# Patient Record
Sex: MALE | Race: WHITE | Employment: FULL TIME | URBAN - METROPOLITAN AREA
[De-identification: names, ages, dates, MRNs, and addresses within clinical notes are randomized per-mention and may not be internally consistent; named-entity substitution may affect disease eponyms.]

---

## 2017-10-13 PROCEDURE — 36415 COLL VENOUS BLD VENIPUNCTURE: CPT | Performed by: FAMILY MEDICINE

## 2017-10-13 PROCEDURE — 87591 N.GONORRHOEAE DNA AMP PROB: CPT | Performed by: FAMILY MEDICINE

## 2017-10-13 PROCEDURE — 87491 CHLMYD TRACH DNA AMP PROBE: CPT | Performed by: FAMILY MEDICINE

## 2017-10-13 PROCEDURE — 87529 HSV DNA AMP PROBE: CPT | Performed by: FAMILY MEDICINE

## 2017-10-27 PROBLEM — A60.02 HERPES GENITALIS IN MEN: Status: ACTIVE | Noted: 2017-10-27

## 2018-07-09 PROBLEM — S62.327A CLOSED DISPLACED FRACTURE OF SHAFT OF FIFTH METACARPAL BONE OF LEFT HAND, INITIAL ENCOUNTER: Status: ACTIVE | Noted: 2018-07-09

## 2021-11-15 PROCEDURE — 88305 TISSUE EXAM BY PATHOLOGIST: CPT | Performed by: INTERNAL MEDICINE

## 2023-12-20 ENCOUNTER — TELEPHONE (OUTPATIENT)
Dept: FAMILY MEDICINE CLINIC | Facility: CLINIC | Age: 33
End: 2023-12-20

## 2023-12-20 NOTE — TELEPHONE ENCOUNTER
S/w pt on this--reports he called to book new pt px with Dr CARTER    Reports 2 mos possible sleep apnea-sts he would have times where he gasps for breath in mid of night  Reports since sat intermittent sob and lt headed  Sts he wonders if it is is anxiety  Feels his heart races at time  When asking if any CP--denies this  Sts he feels like a gas bubble in his chest and back at times  Sts he feels better after burping  Wonders if gas issue    No current sob.    Advised since not our pt I can't technically advise him.    Pt will be seen at next avail appt but I did tell him if his sx's worsen he should seek evaluation at urgent care or ER.    He voiced understanding.    Front staff to book him as he is not established yet.

## 2023-12-22 ENCOUNTER — OFFICE VISIT (OUTPATIENT)
Dept: FAMILY MEDICINE CLINIC | Facility: CLINIC | Age: 33
End: 2023-12-22
Payer: COMMERCIAL

## 2023-12-22 VITALS
TEMPERATURE: 98 F | SYSTOLIC BLOOD PRESSURE: 140 MMHG | BODY MASS INDEX: 32.49 KG/M2 | HEIGHT: 67 IN | DIASTOLIC BLOOD PRESSURE: 100 MMHG | WEIGHT: 207 LBS | RESPIRATION RATE: 16 BRPM | HEART RATE: 89 BPM

## 2023-12-22 DIAGNOSIS — R10.2 SUPRAPUBIC PAIN: ICD-10-CM

## 2023-12-22 DIAGNOSIS — E66.09 CLASS 1 OBESITY DUE TO EXCESS CALORIES WITHOUT SERIOUS COMORBIDITY WITH BODY MASS INDEX (BMI) OF 32.0 TO 32.9 IN ADULT: ICD-10-CM

## 2023-12-22 DIAGNOSIS — F90.0 ATTENTION DEFICIT HYPERACTIVITY DISORDER (ADHD), PREDOMINANTLY INATTENTIVE TYPE: ICD-10-CM

## 2023-12-22 DIAGNOSIS — R06.00 PAROXYSMAL NOCTURNAL DYSPNEA: ICD-10-CM

## 2023-12-22 DIAGNOSIS — R06.02 SHORTNESS OF BREATH: ICD-10-CM

## 2023-12-22 DIAGNOSIS — R00.2 INTERMITTENT PALPITATIONS: ICD-10-CM

## 2023-12-22 DIAGNOSIS — T78.3XXD ANGIOEDEMA, SUBSEQUENT ENCOUNTER: ICD-10-CM

## 2023-12-22 DIAGNOSIS — K21.9 GASTROESOPHAGEAL REFLUX DISEASE, UNSPECIFIED WHETHER ESOPHAGITIS PRESENT: ICD-10-CM

## 2023-12-22 DIAGNOSIS — R10.9 ABDOMINAL CRAMPING: ICD-10-CM

## 2023-12-22 DIAGNOSIS — K52.9 CHRONIC DIARRHEA: Primary | ICD-10-CM

## 2023-12-22 DIAGNOSIS — R03.0 ELEVATED BP WITHOUT DIAGNOSIS OF HYPERTENSION: ICD-10-CM

## 2023-12-22 PROCEDURE — 3080F DIAST BP >= 90 MM HG: CPT | Performed by: STUDENT IN AN ORGANIZED HEALTH CARE EDUCATION/TRAINING PROGRAM

## 2023-12-22 PROCEDURE — 99204 OFFICE O/P NEW MOD 45 MIN: CPT | Performed by: STUDENT IN AN ORGANIZED HEALTH CARE EDUCATION/TRAINING PROGRAM

## 2023-12-22 PROCEDURE — 3008F BODY MASS INDEX DOCD: CPT | Performed by: STUDENT IN AN ORGANIZED HEALTH CARE EDUCATION/TRAINING PROGRAM

## 2023-12-22 PROCEDURE — 3077F SYST BP >= 140 MM HG: CPT | Performed by: STUDENT IN AN ORGANIZED HEALTH CARE EDUCATION/TRAINING PROGRAM

## 2023-12-22 RX ORDER — EMTRICITABINE AND TENOFOVIR DISOPROXIL FUMARATE 200; 300 MG/1; MG/1
1 TABLET, FILM COATED ORAL DAILY
COMMUNITY
Start: 2023-11-03

## 2023-12-22 RX ORDER — DIPHENHYDRAMINE HYDROCHLORIDE 25 MG/1
CAPSULE ORAL
COMMUNITY
Start: 2023-07-16

## 2023-12-22 RX ORDER — EPINEPHRINE 0.3 MG/.3ML
0.3 INJECTION SUBCUTANEOUS ONCE
Qty: 1 EACH | Refills: 0 | Status: SHIPPED | OUTPATIENT
Start: 2023-12-22 | End: 2023-12-22

## 2023-12-22 RX ORDER — DICYCLOMINE HYDROCHLORIDE 10 MG/1
10 CAPSULE ORAL 4 TIMES DAILY PRN
Qty: 90 CAPSULE | Refills: 0 | Status: SHIPPED | OUTPATIENT
Start: 2023-12-22

## 2023-12-22 RX ORDER — OMEPRAZOLE 40 MG/1
40 CAPSULE, DELAYED RELEASE ORAL DAILY
Qty: 90 CAPSULE | Refills: 0 | Status: SHIPPED | OUTPATIENT
Start: 2023-12-22

## 2024-01-03 ENCOUNTER — PATIENT MESSAGE (OUTPATIENT)
Dept: FAMILY MEDICINE CLINIC | Facility: CLINIC | Age: 34
End: 2024-01-03

## 2024-01-03 DIAGNOSIS — T78.3XXD ANGIOEDEMA, SUBSEQUENT ENCOUNTER: Primary | ICD-10-CM

## 2024-01-04 NOTE — TELEPHONE ENCOUNTER
From: Solomon Ceron  To: Denise Landry  Sent: 1/3/2024 2:38 PM CST  Subject: Allergist    Hi Dr. Landry,    I reached out to the allergist you referred me to and they told me they do not take [employer] insurance any longer. Is there another allergist you can refer me to that is with the [facility]? The allergist I called recommended Dr. Simmons in [place] but didn’t want to call before reaching out to you.    Thank you,  Prieto

## 2024-01-04 NOTE — TELEPHONE ENCOUNTER
Please update patient's PCP if he is continuing to see Dr. Landry.  Can refer patient to Dr. De Dios in [place]

## 2024-01-06 ENCOUNTER — LAB ENCOUNTER (OUTPATIENT)
Dept: LAB | Age: 34
End: 2024-01-06
Attending: STUDENT IN AN ORGANIZED HEALTH CARE EDUCATION/TRAINING PROGRAM
Payer: COMMERCIAL

## 2024-01-06 DIAGNOSIS — R06.02 SHORTNESS OF BREATH: ICD-10-CM

## 2024-01-06 DIAGNOSIS — E66.09 CLASS 1 OBESITY DUE TO EXCESS CALORIES WITHOUT SERIOUS COMORBIDITY WITH BODY MASS INDEX (BMI) OF 32.0 TO 32.9 IN ADULT: ICD-10-CM

## 2024-01-06 DIAGNOSIS — K52.9 CHRONIC DIARRHEA: ICD-10-CM

## 2024-01-06 DIAGNOSIS — R10.9 ABDOMINAL CRAMPING: ICD-10-CM

## 2024-01-06 DIAGNOSIS — R10.2 SUPRAPUBIC PAIN: ICD-10-CM

## 2024-01-06 DIAGNOSIS — R00.2 INTERMITTENT PALPITATIONS: ICD-10-CM

## 2024-01-06 DIAGNOSIS — K21.9 GASTROESOPHAGEAL REFLUX DISEASE, UNSPECIFIED WHETHER ESOPHAGITIS PRESENT: ICD-10-CM

## 2024-01-06 DIAGNOSIS — R06.00 PAROXYSMAL NOCTURNAL DYSPNEA: ICD-10-CM

## 2024-01-06 LAB
ALBUMIN SERPL-MCNC: 5.1 G/DL (ref 3.2–4.8)
ALBUMIN/GLOB SERPL: 1.4 {RATIO} (ref 1–2)
ALP LIVER SERPL-CCNC: 70 U/L
ALT SERPL-CCNC: 47 U/L
ANION GAP SERPL CALC-SCNC: 9 MMOL/L (ref 0–18)
AST SERPL-CCNC: 30 U/L (ref ?–34)
BASOPHILS # BLD AUTO: 0.06 X10(3) UL (ref 0–0.2)
BASOPHILS NFR BLD AUTO: 0.9 %
BILIRUB SERPL-MCNC: 0.4 MG/DL (ref 0.3–1.2)
BILIRUB UR QL: NEGATIVE
BUN BLD-MCNC: 10 MG/DL (ref 9–23)
BUN/CREAT SERPL: 10.1 (ref 10–20)
CALCIUM BLD-MCNC: 9.8 MG/DL (ref 8.7–10.4)
CHLORIDE SERPL-SCNC: 103 MMOL/L (ref 98–112)
CHOLEST SERPL-MCNC: 236 MG/DL (ref ?–200)
CLARITY UR: CLEAR
CO2 SERPL-SCNC: 28 MMOL/L (ref 21–32)
COLOR UR: COLORLESS
CREAT BLD-MCNC: 0.99 MG/DL
DEPRECATED RDW RBC AUTO: 39.6 FL (ref 35.1–46.3)
EGFRCR SERPLBLD CKD-EPI 2021: 103 ML/MIN/1.73M2 (ref 60–?)
EOSINOPHIL # BLD AUTO: 0.14 X10(3) UL (ref 0–0.7)
EOSINOPHIL NFR BLD AUTO: 2 %
ERYTHROCYTE [DISTWIDTH] IN BLOOD BY AUTOMATED COUNT: 12 % (ref 11–15)
FASTING PATIENT LIPID ANSWER: YES
FASTING STATUS PATIENT QL REPORTED: YES
GLOBULIN PLAS-MCNC: 3.6 G/DL (ref 2.8–4.4)
GLUCOSE BLD-MCNC: 87 MG/DL (ref 70–99)
GLUCOSE UR-MCNC: NORMAL MG/DL
HCT VFR BLD AUTO: 44.2 %
HDLC SERPL-MCNC: 34 MG/DL (ref 40–59)
HGB BLD-MCNC: 15.3 G/DL
HGB UR QL STRIP.AUTO: NEGATIVE
IGA SERPL-MCNC: 440.9 MG/DL (ref 70–312)
IMM GRANULOCYTES # BLD AUTO: 0.04 X10(3) UL (ref 0–1)
IMM GRANULOCYTES NFR BLD: 0.6 %
KETONES UR-MCNC: NEGATIVE MG/DL
LDLC SERPL CALC-MCNC: 163 MG/DL (ref ?–100)
LEUKOCYTE ESTERASE UR QL STRIP.AUTO: NEGATIVE
LYMPHOCYTES # BLD AUTO: 2.15 X10(3) UL (ref 1–4)
LYMPHOCYTES NFR BLD AUTO: 30.5 %
MCH RBC QN AUTO: 31.2 PG (ref 26–34)
MCHC RBC AUTO-ENTMCNC: 34.6 G/DL (ref 31–37)
MCV RBC AUTO: 90.2 FL
MONOCYTES # BLD AUTO: 0.54 X10(3) UL (ref 0.1–1)
MONOCYTES NFR BLD AUTO: 7.7 %
NEUTROPHILS # BLD AUTO: 4.11 X10 (3) UL (ref 1.5–7.7)
NEUTROPHILS # BLD AUTO: 4.11 X10(3) UL (ref 1.5–7.7)
NEUTROPHILS NFR BLD AUTO: 58.3 %
NITRITE UR QL STRIP.AUTO: NEGATIVE
NONHDLC SERPL-MCNC: 202 MG/DL (ref ?–130)
OSMOLALITY SERPL CALC.SUM OF ELEC: 288 MOSM/KG (ref 275–295)
PH UR: 6.5 [PH] (ref 5–8)
PLATELET # BLD AUTO: 285 10(3)UL (ref 150–450)
POTASSIUM SERPL-SCNC: 3.8 MMOL/L (ref 3.5–5.1)
PROT SERPL-MCNC: 8.7 G/DL (ref 5.7–8.2)
PROT UR-MCNC: NEGATIVE MG/DL
RBC # BLD AUTO: 4.9 X10(6)UL
SODIUM SERPL-SCNC: 140 MMOL/L (ref 136–145)
SP GR UR STRIP: 1.01 (ref 1–1.03)
T4 FREE SERPL-MCNC: 1.1 NG/DL (ref 0.8–1.7)
TRIGL SERPL-MCNC: 212 MG/DL (ref 30–149)
TSI SER-ACNC: 3.21 MIU/ML (ref 0.55–4.78)
UROBILINOGEN UR STRIP-ACNC: NORMAL
VLDLC SERPL CALC-MCNC: 42 MG/DL (ref 0–30)
WBC # BLD AUTO: 7 X10(3) UL (ref 4–11)

## 2024-01-06 PROCEDURE — 84443 ASSAY THYROID STIM HORMONE: CPT

## 2024-01-06 PROCEDURE — 82784 ASSAY IGA/IGD/IGG/IGM EACH: CPT

## 2024-01-06 PROCEDURE — 85025 COMPLETE CBC W/AUTO DIFF WBC: CPT

## 2024-01-06 PROCEDURE — 86364 TISS TRNSGLTMNASE EA IG CLAS: CPT

## 2024-01-06 PROCEDURE — 81003 URINALYSIS AUTO W/O SCOPE: CPT

## 2024-01-06 PROCEDURE — 84439 ASSAY OF FREE THYROXINE: CPT

## 2024-01-06 PROCEDURE — 36415 COLL VENOUS BLD VENIPUNCTURE: CPT

## 2024-01-06 PROCEDURE — 80061 LIPID PANEL: CPT

## 2024-01-06 PROCEDURE — 80053 COMPREHEN METABOLIC PANEL: CPT

## 2024-01-07 ENCOUNTER — PATIENT MESSAGE (OUTPATIENT)
Dept: FAMILY MEDICINE CLINIC | Facility: CLINIC | Age: 34
End: 2024-01-07

## 2024-01-08 ENCOUNTER — NURSE ONLY (OUTPATIENT)
Dept: ALLERGY | Facility: CLINIC | Age: 34
End: 2024-01-08

## 2024-01-08 ENCOUNTER — OFFICE VISIT (OUTPATIENT)
Dept: ALLERGY | Facility: CLINIC | Age: 34
End: 2024-01-08
Payer: COMMERCIAL

## 2024-01-08 VITALS
WEIGHT: 207 LBS | DIASTOLIC BLOOD PRESSURE: 95 MMHG | HEIGHT: 67 IN | HEART RATE: 72 BPM | OXYGEN SATURATION: 98 % | SYSTOLIC BLOOD PRESSURE: 155 MMHG | BODY MASS INDEX: 32.49 KG/M2

## 2024-01-08 DIAGNOSIS — Z23 FLU VACCINE NEED: ICD-10-CM

## 2024-01-08 DIAGNOSIS — H10.10 SEASONAL AND PERENNIAL ALLERGIC RHINOCONJUNCTIVITIS: ICD-10-CM

## 2024-01-08 DIAGNOSIS — J30.2 SEASONAL AND PERENNIAL ALLERGIC RHINOCONJUNCTIVITIS: ICD-10-CM

## 2024-01-08 DIAGNOSIS — T78.3XXA ANGIOEDEMA, INITIAL ENCOUNTER: Primary | ICD-10-CM

## 2024-01-08 DIAGNOSIS — Z23 NEED FOR COVID-19 VACCINE: ICD-10-CM

## 2024-01-08 DIAGNOSIS — R22.0 LIP SWELLING: ICD-10-CM

## 2024-01-08 DIAGNOSIS — J30.89 SEASONAL AND PERENNIAL ALLERGIC RHINOCONJUNCTIVITIS: ICD-10-CM

## 2024-01-08 LAB — TTG IGA SER-ACNC: 1.5 U/ML (ref ?–7)

## 2024-01-08 PROCEDURE — 95004 PERQ TESTS W/ALRGNC XTRCS: CPT | Performed by: ALLERGY & IMMUNOLOGY

## 2024-01-08 PROCEDURE — 3080F DIAST BP >= 90 MM HG: CPT | Performed by: ALLERGY & IMMUNOLOGY

## 2024-01-08 PROCEDURE — 99204 OFFICE O/P NEW MOD 45 MIN: CPT | Performed by: ALLERGY & IMMUNOLOGY

## 2024-01-08 PROCEDURE — 3077F SYST BP >= 140 MM HG: CPT | Performed by: ALLERGY & IMMUNOLOGY

## 2024-01-08 PROCEDURE — 3008F BODY MASS INDEX DOCD: CPT | Performed by: ALLERGY & IMMUNOLOGY

## 2024-01-08 RX ORDER — EMTRICITABINE AND TENOFOVIR DISOPROXIL FUMARATE 133; 200 MG/1; MG/1
TABLET, FILM COATED ORAL
COMMUNITY
Start: 2022-07-01

## 2024-01-08 RX ORDER — AMOXICILLIN AND CLAVULANATE POTASSIUM 875; 125 MG/1; MG/1
TABLET, FILM COATED ORAL
COMMUNITY
Start: 2023-08-08

## 2024-01-08 RX ORDER — METHYLPREDNISOLONE 4 MG/1
TABLET ORAL
Qty: 21 TABLET | Refills: 0 | Status: SHIPPED | OUTPATIENT
Start: 2024-01-08

## 2024-01-08 RX ORDER — LEVOCETIRIZINE DIHYDROCHLORIDE 5 MG/1
5 TABLET, FILM COATED ORAL 2 TIMES DAILY
Qty: 180 TABLET | Refills: 1 | Status: SHIPPED | OUTPATIENT
Start: 2024-01-08

## 2024-01-08 RX ORDER — EPINEPHRINE 0.3 MG/.3ML
INJECTION SUBCUTANEOUS
COMMUNITY
Start: 2023-12-22

## 2024-01-08 RX ORDER — EMTRICITABINE AND TENOFOVIR ALAFENAMIDE 200; 25 MG/1; MG/1
TABLET ORAL
COMMUNITY
Start: 2023-12-21

## 2024-01-08 RX ORDER — ALBUTEROL SULFATE 90 UG/1
2 AEROSOL, METERED RESPIRATORY (INHALATION) EVERY 4 HOURS PRN
Qty: 1 EACH | Refills: 0 | Status: SHIPPED | OUTPATIENT
Start: 2024-01-08

## 2024-01-08 NOTE — PATIENT INSTRUCTIONS
#1 lip swelling/angioedema  Handouts provided and reviewed including common etiologies  Xyzal, levocetirizine 5 mg once a day up to 4 times per day if needed  Handouts on alcohol intolerance provided and reviewed as alcohol the night before has been associated with swelling episodes next morning.  Check C4 level  Prior CBC CMP TSH were normal Medrol Dosepak provided for patient to have at home in case of future episodes of Xyzal is not helping  Recommended trial addition of Xyzal on a daily basis up to twice a day and attempt to prevent episodes over the next 1 to 2 months  Given his frequency of symptoms  Patient has up-to-date EpiPen            #2 allergic rhinitis  Reviewed avoidance measures and potential treatment option of immunotherapy  See above skin testing to screen for allergic triggers  Xyzal 5 mg once a day as an antihistamine  May add Flonase or Nasacort 2 sprays per nostril once a day if having prominent nasal congestion and postnasal drip    #3 flu vaccine recommended and offered    #4 COVID-vaccine record reviewed new booster available is in September 2023.  Reviewed I do not carry the COVID-vaccine in my office.  Please check with local pharmacy           Orders This Visit:  Orders Placed This Encounter   Procedures    Complement C4, Serum       Meds This Visit:  Requested Prescriptions     Signed Prescriptions Disp Refills    albuterol (PROAIR HFA) 108 (90 Base) MCG/ACT Inhalation Aero Soln 1 each 0     Sig: Inhale 2 puffs into the lungs every 4 (four) hours as needed for Wheezing.    methylPREDNISolone 4 MG Oral Tablet Therapy Pack 21 tablet 0     Sig: Take as directed with food    levocetirizine 5 MG Oral Tab 180 tablet 1     Sig: Take 1 tablet (5 mg total) by mouth in the morning and 1 tablet (5 mg total) before bedtime.

## 2024-01-08 NOTE — TELEPHONE ENCOUNTER
Please see lab result 1/6/24.   This is patient's response to Dr. Landry's [id number] message,  \"Elevated protein - are you taking a protein supplement? \"

## 2024-01-08 NOTE — PROGRESS NOTES
Solomon Ceron is a 33 year old male.    HPI:     Chief Complaint   Patient presents with    Allergies     Patient states after drinking beer the next morning has lip swelling, denies hives.     Patient is a 33-year-old male who presents for allergy consultation upon referral of his PCP with a chief complaint of lip swelling.  Prior note from visit with PCP on January 5, 2024 reviewed and appreciated      Review of records show labs from January 6, 2024 including TSH CMP CBC were normal.    Review of immunization records show 2 prior COVID vaccines last in May 2021.  No flu vaccine on record on file    Today patient reports      Allergies  Duration:  June 2023  10x in total   Freq: 1x/month   5 mild., 5 more prominent   Ed /uc: 1 uc  Swelling last hours after benadryl   Timing: Intermittent  Symptoms: Lip swelling  1-2 hrs after awakening in am  Usually after drinking beer /etoh the night prior   Denies hives or resp issues   Severity: Moderate  Denies oral swelling tongue swelling or respiratory issues  Status: no current symptoms   Triggers: Allergies?  Tried:  benadryl prn     Pets:  denies   Nonsmoker   MJ prn   Has epipen, no usage     Hx of asthma, ad, ar  or food allergy:    History of allergic rhinitis  No asthma or hx of food allergy   No foods that morning       + gerd on PPI   Hx of ibs on dicyclomine     1/6/24: tsh 3.214, cmp , cbc       Int sob and need to take a deep breath  no cough . Wz,   No asthma  Worse after eating   + gerd    No leg swelling   Ok with exercise    + etoh  Ok with nsaids   No a/w exercise      Defers flu vaccine            HISTORY:  Past Medical History:   Diagnosis Date    Allergic rhinitis     Attention deficit hyperactivity disorder (ADHD)     Esophageal reflux     Sleep apnea       Past Surgical History:   Procedure Laterality Date    EGD  11/2021    gastritis      Family History   Problem Relation Age of Onset    Other (Diverticulitis) Mother     Cancer Maternal  Grandmother         lung    Heart Attack Paternal Grandfather     Diabetes Paternal Grandfather       Social History:   Social History     Socioeconomic History    Marital status: Single   Tobacco Use    Smoking status: Never     Passive exposure: Never    Smokeless tobacco: Never   Vaping Use    Vaping Use: Never used   Substance and Sexual Activity    Alcohol use: Yes     Alcohol/week: 5.0 - 10.0 standard drinks of alcohol     Types: 5 - 10 Cans of beer per week     Comment: socially    Drug use: Yes     Types: Cannabis     Comment: occasional    Sexual activity: Yes   Other Topics Concern    Caffeine Concern No    Sleep Concern Yes    Stress Concern No    Weight Concern No    Special Diet No    Exercise No    Seat Belt No        Medications (Active prior to today's visit):  Current Outpatient Medications   Medication Sig Dispense Refill    albuterol (PROAIR HFA) 108 (90 Base) MCG/ACT Inhalation Aero Soln Inhale 2 puffs into the lungs every 4 (four) hours as needed for Wheezing. 1 each 0    methylPREDNISolone 4 MG Oral Tablet Therapy Pack Take as directed with food 21 tablet 0    levocetirizine 5 MG Oral Tab Take 1 tablet (5 mg total) by mouth in the morning and 1 tablet (5 mg total) before bedtime. 180 tablet 1    emtricitabine-tenofovir 200-300 MG Oral Tab Take 1 tablet by mouth daily.      Omeprazole 40 MG Oral Capsule Delayed Release Take 1 capsule (40 mg total) by mouth daily. 90 capsule 0    amoxicillin clavulanate 875-125 MG Oral Tab TAKE 1 TABLET BY MOUTH TWICE DAILY (Patient not taking: Reported on 1/8/2024)      DESCOVY 200-25 MG Oral Tab TAKE ONE TABLET BY MOUTH DAILY (Patient not taking: Reported on 1/8/2024)      EPINEPHrine 0.3 MG/0.3ML Injection Solution Auto-injector INJECT 0.3 ML INTO THE MUSCLE AS DIRECTED ONE TIME (Patient not taking: Reported on 1/8/2024)      Emtricitabine-Tenofovir -200 MG Oral Tab  (Patient not taking: Reported on 1/8/2024)      amphetamine-dextroamphetamine (ADDERALL)  10 MG Oral Tab Take 1 tablet (10 mg total) by mouth 2 (two) times daily. (Patient not taking: Reported on 1/8/2024) 60 tablet 0    [START ON 1/27/2024] amphetamine-dextroamphetamine (ADDERALL) 10 MG Oral Tab Take 1 tablet (10 mg total) by mouth 2 (two) times daily. (Patient not taking: Reported on 1/8/2024) 60 tablet 0    [START ON 2/27/2024] amphetamine-dextroamphetamine (ADDERALL) 10 MG Oral Tab Take 1 tablet (10 mg total) by mouth 2 (two) times daily. (Patient not taking: Reported on 1/8/2024) 60 tablet 0    BANOPHEN 25 MG Oral Cap  (Patient not taking: Reported on 1/8/2024)      dicyclomine 10 MG Oral Cap Take 1 capsule (10 mg total) by mouth 4 (four) times daily as needed (cramping). (Patient not taking: Reported on 1/8/2024) 90 capsule 0    amphetamine-dextroamphetamine 10 MG Oral Tab Take 1 tablet (10 mg total) by mouth 2 (two) times daily. (Patient not taking: Reported on 1/8/2024)      sucralfate (CARAFATE) 1 GM/10ML Oral Suspension Take 10 mL (1 g total) by mouth 3 (three) times daily as needed. (Patient not taking: Reported on 1/8/2024) 240 mL 3       Allergies:  No Known Allergies      ROS:     Allergic/Immuno:  See HPI  Cardiovascular:  Negative for irregular heartbeat/palpitations, chest pain, edema  Constitutional:  Negative night sweats,weight loss, irritability and lethargy  Endocrine:  Negative for cold intolerance, polydipsia and polyphagia  ENMT:  Negative for ear drainage, hearing loss and nasal drainage  Eyes:  Negative for eye discharge and vision loss  Gastrointestinal:  Negative for abdominal pain, diarrhea and vomiting  Genitourinary:  Negative for dysuria and hematuria  Hema/Lymph:  Negative for easy bleeding and easy bruising  Integumentary:  Negative for pruritus and rash  Musculoskeletal:  Negative for joint symptoms  Neurological:  Negative for dizziness, seizures  Psychiatric:  Negative for inappropriate interaction and psychiatric symptoms  Respiratory:  Negative for cough, dyspnea  and wheezing      PHYSICAL EXAM:   Constitutional: responsive, no acute distress noted  Head/Face: NC/Atraumatic  Eyes/Vision: conjunctiva and lids are normal extraocular motion is intact   Ears/Audiometry: tympanic membranes are normal bilaterally hearing is grossly intact  Nose/Mouth/Throat: nose and throat are clear mucous membranes are moist   Neck/Thyroid: neck is supple without adenopathy  Lymphatic: no abnormal cervical, supraclavicular or axillary adenopathy is noted  Respiratory: normal to inspection lungs are clear to auscultation bilaterally normal respiratory effort   Cardiovascular: regular rate and rhythm no murmurs, gallups, or rubs  Abdomen: soft non-tender non-distended  Skin/Hair: no unusual rashes present  Extremities: no edema, cyanosis, or clubbing  Neurological:Oriented to time, place, person & situation       ASSESSMENT/PLAN:   Assessment   Encounter Diagnoses   Name Primary?    Angioedema, initial encounter Yes    Lip swelling     Seasonal and perennial allergic rhinoconjunctivitis     Flu vaccine need     Need for COVID-19 vaccine      Skin testing today to, indoor and outdoor environmental allergies was tree, grass, rw, cats     Skin testing today to select foods including wheat barley rye and hops was negative     Positive histamine control      #1 lip swelling/angioedema  Handouts provided and reviewed including common etiologies  Xyzal, levocetirizine 5 mg once a day up to 4 times per day if needed  Handouts on alcohol intolerance provided and reviewed as alcohol the night before has been associated with swelling episodes next morning.  Check C4 level  Prior CBC CMP TSH were normal Medrol Dosepak provided for patient to have at home in case of future episodes of Xyzal is not helping  Recommended trial addition of Xyzal on a daily basis up to twice a day and attempt to prevent episodes over the next 1 to 2 months  Given his frequency of symptoms  Patient has up-to-date  EpiPen            #2 allergic rhinitis  Reviewed avoidance measures and potential treatment option of immunotherapy  See above skin testing to screen for allergic triggers  Xyzal 5 mg once a day as an antihistamine  May add Flonase or Nasacort 2 sprays per nostril once a day if having prominent nasal congestion and postnasal drip    #3 flu vaccine recommended and offered    #4 COVID-vaccine record reviewed new booster available is in September 2023.  Reviewed I do not carry the COVID-vaccine in my office.  Please check with local pharmacy           Orders This Visit:  Orders Placed This Encounter   Procedures    Complement C4, Serum       Meds This Visit:  Requested Prescriptions     Signed Prescriptions Disp Refills    albuterol (PROAIR HFA) 108 (90 Base) MCG/ACT Inhalation Aero Soln 1 each 0     Sig: Inhale 2 puffs into the lungs every 4 (four) hours as needed for Wheezing.    methylPREDNISolone 4 MG Oral Tablet Therapy Pack 21 tablet 0     Sig: Take as directed with food    levocetirizine 5 MG Oral Tab 180 tablet 1     Sig: Take 1 tablet (5 mg total) by mouth in the morning and 1 tablet (5 mg total) before bedtime.       Imaging & Referrals:  None     1/8/2024  Jhoan De Dios MD      If medication samples were provided today, they were provided solely for patient education and training related to self administration of these medications.  Teaching, instruction and sample was provided to the patient by myself.  Teaching included  a review of potential adverse side effects as well as potential efficacy.  Patient's questions were answered in regards to medication administration and dosing and potential side effects. Teaching was provided via the teach back method

## 2024-01-08 NOTE — TELEPHONE ENCOUNTER
From: Solomon Ceron  To: Denise Landry  Sent: 1/7/2024 4:41 PM CST  Subject: Protein    I do not take any protein supplements.

## 2024-01-26 ENCOUNTER — HOSPITAL ENCOUNTER (OUTPATIENT)
Dept: CV DIAGNOSTICS | Facility: HOSPITAL | Age: 34
Discharge: HOME | End: 2024-01-26
Attending: STUDENT IN AN ORGANIZED HEALTH CARE EDUCATION/TRAINING PROGRAM
Payer: COMMERCIAL

## 2024-01-26 DIAGNOSIS — R00.2 INTERMITTENT PALPITATIONS: ICD-10-CM

## 2024-01-26 DIAGNOSIS — R06.02 SHORTNESS OF BREATH: ICD-10-CM

## 2024-01-26 DIAGNOSIS — R06.00 PAROXYSMAL NOCTURNAL DYSPNEA: ICD-10-CM

## 2024-01-26 PROCEDURE — 93306 TTE W/DOPPLER COMPLETE: CPT | Performed by: STUDENT IN AN ORGANIZED HEALTH CARE EDUCATION/TRAINING PROGRAM

## 2024-02-27 ENCOUNTER — PATIENT MESSAGE (OUTPATIENT)
Dept: FAMILY MEDICINE CLINIC | Facility: CLINIC | Age: 34
End: 2024-02-27

## 2024-02-27 DIAGNOSIS — F90.0 ATTENTION DEFICIT HYPERACTIVITY DISORDER (ADHD), PREDOMINANTLY INATTENTIVE TYPE: ICD-10-CM

## 2024-02-27 RX ORDER — DEXTROAMPHETAMINE SACCHARATE, AMPHETAMINE ASPARTATE, DEXTROAMPHETAMINE SULFATE AND AMPHETAMINE SULFATE 1.25; 1.25; 1.25; 1.25 MG/1; MG/1; MG/1; MG/1
10 TABLET ORAL 2 TIMES DAILY
Qty: 120 TABLET | Refills: 0 | OUTPATIENT
Start: 2024-02-27

## 2024-02-27 NOTE — TELEPHONE ENCOUNTER
Requested Prescriptions     Pending Prescriptions Disp Refills    amphetamine-dextroamphetamine 5 MG Oral Tab 120 tablet 0     Sig: Take 2 tablets (10 mg total) by mouth 2 (two) times daily.     10mg tab was refilled today  Duplicate request  Refill denied.

## 2024-02-27 NOTE — TELEPHONE ENCOUNTER
From: Solomon Ceron  To: Denise Landry  Sent: 2/27/2024 3:33 PM CST  Subject: Adderall     Hi Dr. Landry,    I put in a request to refill my adderall prescription through the [facility] but received an automated message saying it was denied. Not sure if it’s something where I need to reach out directly each month or if I would need to schedule another appointment.     Thank you,  Prieto

## 2024-03-15 ENCOUNTER — PATIENT MESSAGE (OUTPATIENT)
Dept: FAMILY MEDICINE CLINIC | Facility: CLINIC | Age: 34
End: 2024-03-15

## 2024-03-15 NOTE — TELEPHONE ENCOUNTER
From: Solomon Ceron  To: Denise Landry  Sent: 3/15/2024 12:56 PM CDT  Subject: Adderal    Hi Dr. Landry,    I previously messaged about my adderal refill and it sounded like a refill was called into [facility]. When I went to [facility] they said they didn’t have a refill and that you would need to submit the refill for it. Not sure if I did something in the [facility] thao since I clicked refill as it showed there was one available.     Thanks,  Prieto

## 2024-03-18 NOTE — TELEPHONE ENCOUNTER
Tc sharath sanchez on this  They confirm pt still has all 3 RX's from the panel we sent in December  They had been out of stock so they wonder if that is why pt never picked up    They will dispense the December one today as it is only good x 3 mos from the order date    I sent msg to pt letting him know, reminded him to book appt

## 2024-05-20 ENCOUNTER — TELEPHONE (OUTPATIENT)
Dept: ALLERGY | Facility: CLINIC | Age: 34
End: 2024-05-20

## 2024-05-20 NOTE — TELEPHONE ENCOUNTER
Labs from 01/08/2024 have not been completed.   Letter sent home.   Postponed x 2 months.     Dr. De Dios, if labs have not been completed in that time okay to cancel?

## 2024-05-28 ENCOUNTER — OFFICE VISIT (OUTPATIENT)
Dept: FAMILY MEDICINE CLINIC | Facility: CLINIC | Age: 34
End: 2024-05-28

## 2024-05-28 ENCOUNTER — LAB ENCOUNTER (OUTPATIENT)
Dept: LAB | Age: 34
End: 2024-05-28
Attending: FAMILY MEDICINE
Payer: COMMERCIAL

## 2024-05-28 VITALS
DIASTOLIC BLOOD PRESSURE: 82 MMHG | RESPIRATION RATE: 18 BRPM | SYSTOLIC BLOOD PRESSURE: 120 MMHG | HEIGHT: 67 IN | WEIGHT: 205 LBS | BODY MASS INDEX: 32.18 KG/M2 | HEART RATE: 95 BPM | TEMPERATURE: 98 F

## 2024-05-28 DIAGNOSIS — Z72.51 HIGH RISK SEXUAL BEHAVIOR, UNSPECIFIED TYPE: ICD-10-CM

## 2024-05-28 DIAGNOSIS — N48.9 PENILE LESION: ICD-10-CM

## 2024-05-28 LAB
HBV SURFACE AB SER QL: REACTIVE
HBV SURFACE AB SERPL IA-ACNC: 15 MIU/ML
HBV SURFACE AG SER-ACNC: <0.1 [IU]/L
HBV SURFACE AG SERPL QL IA: NONREACTIVE
HCV AB SERPL QL IA: NONREACTIVE
T PALLIDUM AB SER QL IA: REACTIVE

## 2024-05-28 PROCEDURE — 86592 SYPHILIS TEST NON-TREP QUAL: CPT

## 2024-05-28 PROCEDURE — 87591 N.GONORRHOEAE DNA AMP PROB: CPT

## 2024-05-28 PROCEDURE — 87491 CHLMYD TRACH DNA AMP PROBE: CPT

## 2024-05-28 PROCEDURE — 86706 HEP B SURFACE ANTIBODY: CPT

## 2024-05-28 PROCEDURE — 87340 HEPATITIS B SURFACE AG IA: CPT

## 2024-05-28 PROCEDURE — 87389 HIV-1 AG W/HIV-1&-2 AB AG IA: CPT

## 2024-05-28 PROCEDURE — 86803 HEPATITIS C AB TEST: CPT

## 2024-05-28 PROCEDURE — 99214 OFFICE O/P EST MOD 30 MIN: CPT | Performed by: FAMILY MEDICINE

## 2024-05-28 PROCEDURE — 86780 TREPONEMA PALLIDUM: CPT

## 2024-05-28 RX ORDER — TRIAMCINOLONE ACETONIDE OINTMENT USP, 0.05% 0.5 MG/G
1 OINTMENT TOPICAL 2 TIMES DAILY
Qty: 45 G | Refills: 0 | Status: SHIPPED | OUTPATIENT
Start: 2024-05-28

## 2024-05-28 RX ORDER — VALACYCLOVIR HYDROCHLORIDE 500 MG/1
1000 TABLET, FILM COATED ORAL 2 TIMES DAILY
COMMUNITY
Start: 2024-05-25

## 2024-05-28 NOTE — PROGRESS NOTES
[facility] Family Medicine Office Note  Chief Complaint:   Chief Complaint   Patient presents with    Penis/Scrotum Problem       HPI:   This is a 33 year old male coming in for a penile lesion.  The patient states that he noticed this a week ago.  States that there has been some pain as well as some tearing after sexual activity.  States he does have a history of herpes.  States that he has been with the same sexual partner.      Past Medical History:    Allergic rhinitis    Attention deficit hyperactivity disorder (ADHD)    Esophageal reflux    Sleep apnea     Past Surgical History:   Procedure Laterality Date    Egd  11/2021    gastritis     Social History:  Social History     Socioeconomic History    Marital status: Single   Tobacco Use    Smoking status: Never     Passive exposure: Never    Smokeless tobacco: Never   Vaping Use    Vaping status: Never Used   Substance and Sexual Activity    Alcohol use: Yes     Alcohol/week: 5.0 - 10.0 standard drinks of alcohol     Types: 5 - 10 Cans of beer per week     Comment: socially    Drug use: Yes     Types: Cannabis     Comment: occasional    Sexual activity: Yes   Other Topics Concern    Caffeine Concern No    Sleep Concern Yes    Stress Concern No    Weight Concern No    Special Diet No    Exercise No    Seat Belt No     Family History:  Family History   Problem Relation Age of Onset    Other (Diverticulitis) Mother     Cancer Maternal Grandmother         lung    Heart Attack Paternal Grandfather     Diabetes Paternal Grandfather      Allergies:  No Known Allergies  Current Meds:  Current Outpatient Medications   Medication Sig Dispense Refill    valACYclovir 500 MG Oral Tab Take 2 tablets (1,000 mg total) by mouth 2 (two) times daily.      Triamcinolone Acetonide 0.05 % External Ointment Apply 1 cm topically in the morning and 1 cm before bedtime. 45 g 0    amphetamine-dextroamphetamine 5 MG Oral Tab Take 2 tablets (10 mg total) by mouth 2 (two) times  daily. 120 tablet 0    DESCOVY 200-25 MG Oral Tab       dicyclomine 10 MG Oral Cap Take 1 capsule (10 mg total) by mouth 4 (four) times daily as needed (cramping). 90 capsule 0    amphetamine-dextroamphetamine 10 MG Oral Tab Take 1 tablet (10 mg total) by mouth 2 (two) times daily.      EPINEPHrine 0.3 MG/0.3ML Injection Solution Auto-injector INJECT 0.3 ML INTO THE MUSCLE AS DIRECTED ONE TIME (Patient not taking: Reported on 1/8/2024)        Counseling given: Not Answered       REVIEW OF SYSTEMS:   Consitutional: No fevers, chills, sweats  Eye: No recent visual problems  ENMT: No ear pain nasal congestion sore throat  Respiratory: No shortness of breath, cough  Cardiovascular: No chest pain palpitations syncope  Gastrointestinal: No nausea vomiting diarrhea  Genitourinary: No hematuria  Hema/Lymph no bruising tendency, swollen lymph glands  Endocrine: Negative for excessive thirst excessive hunger  Musculoskeletal: No back pain neck pain joint pain muscle pain decreased range of motion  Integumentary: Positive skin lesion    Medical, surgical, family, and social histories were reviewed      EXAM:   VITALS:   Vitals:    05/28/24 0842   BP: 120/82   Pulse: 95   Resp: 18   Temp: 97.5 °F (36.4 °C)      GENERAL: well developed, well nourished, in no apparent distress  SKIN: no rashes, no suspicious lesions: Cool and Dry  HEENT: atraumatic, normocephalic, ears and throat are clear    Ears: TM's clear and visible bilaterally, no excess cerumen or erythema.   EYES: Pupils equal round and reactive.  Extraocular motions intact no scleral icterus no injection or drainage  THROAT without erythema tonsillar hypertrophy or exudate.  Uvula midline airway patent  NECK: Given midline.  No JVD or lymphadenopathy supple nontender no meningeal signs   LUNGS: clear to auscultation sounds equal bilaterally no wheezes rales or rhonchi  CARDIO: Regular rate and rhythm without murmurs gallops or rubs    round erythematous lesion of the  corona of the penis     ASSESSMENT AND PLAN:   1. Penile lesion  - Triamcinolone Acetonide 0.05 % External Ointment; Apply 1 cm topically in the morning and 1 cm before bedtime.  Dispense: 45 g; Refill: 0  I did discuss with the patient at this time I would like to complete some blood work.  This lesion is suspicious for a possible chancroid although this also could be a vesicular lesion because of his herpes type II.  He was given a prescription for triamcinolone to be applied to the area I did asked the patient to have syphilis screening done HIV hepatitis once we have the results we will go over them and make any further recommendations.  2. High risk sexual behavior, unspecified type  - T Pallidum Screening [place]; Future  - HIV AG AB Combo (Consent Obtained prechecked); Future  - Urine Chlamydia/GC Amplification; Future  - Hepatitis B Surface Antibody; Future  - Hepatitis B Surface Antigen; Future  - HCV Antibody; Future       Meds & Refills for this Visit:  Requested Prescriptions     Signed Prescriptions Disp Refills    Triamcinolone Acetonide 0.05 % External Ointment 45 g 0     Sig: Apply 1 cm topically in the morning and 1 cm before bedtime.       Health Maintenance:  Health Maintenance Due   Topic Date Due    Annual Physical  Never done    DTaP,Tdap,and Td Vaccines (1 - Tdap) Never done    COVID-19 Vaccine (3 - 2023-24 season) 09/01/2023    Annual Depression Screening  01/01/2024       Patient/Caregiver Education: Patient/Caregiver Education: There are no barriers to learning. Medical education done.   Outcome: Patient verbalizes understanding. Patient is notified to call with any questions, complications, allergies, or worsening or changing symptoms.  Patient is to call with any side effects or complications from the treatments as a result of today.     Problem List:  Patient Active Problem List   Diagnosis    Herpes genitalis in men    Closed displaced fracture of shaft of fifth metacarpal bone of left  hand, initial encounter         No follow-ups on file.     Sea Gonzalez MD    Please note that portions of this note may have been completed with a voice recognition program. Efforts were made to edit the dictations but occasionally words are mis-transcribed.

## 2024-05-29 DIAGNOSIS — K21.9 GASTROESOPHAGEAL REFLUX DISEASE, UNSPECIFIED WHETHER ESOPHAGITIS PRESENT: ICD-10-CM

## 2024-05-29 LAB
C TRACH DNA SPEC QL NAA+PROBE: NEGATIVE
N GONORRHOEA DNA SPEC QL NAA+PROBE: NEGATIVE

## 2024-05-29 RX ORDER — OMEPRAZOLE 40 MG/1
40 CAPSULE, DELAYED RELEASE ORAL DAILY
Qty: 90 CAPSULE | Refills: 0 | Status: SHIPPED | OUTPATIENT
Start: 2024-05-29

## 2024-05-29 NOTE — TELEPHONE ENCOUNTER
Last office visit: 12/22/2023  Last Refill: 12/22/2023  Return To Clinic: 3/22/2024 per last office visit. Please refill if appropriate  Protocol: failed         Medication Quantity Refills Start End   Omeprazole 40 MG Oral Capsule Delayed Release (Discontinued) 90 capsule 0 12/22/2023 5/28/2024   Sig:   Take 1 capsule (40 mg total) by mouth daily.     Patient not taking:   Reported on 5/28/2024

## 2024-05-31 ENCOUNTER — TELEPHONE (OUTPATIENT)
Dept: FAMILY MEDICINE CLINIC | Facility: CLINIC | Age: 34
End: 2024-05-31

## 2024-05-31 LAB — RPR SER QL: NONREACTIVE

## 2024-05-31 NOTE — TELEPHONE ENCOUNTER
Call and spoke with the patient and informed him of his test results. The pt was given the Counts include 234 beds at the [facility] Dept info o call and schedule his appts.  MD made aware.     The pt was also advised to inform his partner so they can be treated as well, either thru their pcp or the health dept. The pt voice understanding and had no further questions or concerns.

## 2024-06-03 ENCOUNTER — TELEPHONE (OUTPATIENT)
Dept: FAMILY MEDICINE CLINIC | Facility: CLINIC | Age: 34
End: 2024-06-03

## 2024-06-03 LAB — SYPHILIS AB BY TP-PA, SERUM: POSITIVE

## 2024-06-03 NOTE — TELEPHONE ENCOUNTER
Lima from Health dept called  Numerous questions about pt's syphilis results  Questions answered.  They rec'd form from our office regarding results/treatment.    Reports shortage of pcn, sts they only give to pregnant womem and children    Sts they give oral doxy. Per TE from last week pt is going to the health dept tomorrow. She is aware and they will address further with pt directly    Sending as FELIPE

## 2024-06-19 ENCOUNTER — PATIENT MESSAGE (OUTPATIENT)
Dept: FAMILY MEDICINE CLINIC | Facility: CLINIC | Age: 34
End: 2024-06-19

## 2024-06-19 NOTE — TELEPHONE ENCOUNTER
If you completed the treatment they gave you   Then the lesion will just take some time to go away no other tx needed

## 2024-09-18 ENCOUNTER — OFFICE VISIT (OUTPATIENT)
Dept: FAMILY MEDICINE CLINIC | Facility: CLINIC | Age: 34
End: 2024-09-18
Payer: COMMERCIAL

## 2024-09-18 ENCOUNTER — LAB ENCOUNTER (OUTPATIENT)
Dept: LAB | Age: 34
End: 2024-09-18
Attending: STUDENT IN AN ORGANIZED HEALTH CARE EDUCATION/TRAINING PROGRAM
Payer: COMMERCIAL

## 2024-09-18 ENCOUNTER — PATIENT MESSAGE (OUTPATIENT)
Dept: FAMILY MEDICINE CLINIC | Facility: CLINIC | Age: 34
End: 2024-09-18

## 2024-09-18 VITALS
RESPIRATION RATE: 16 BRPM | TEMPERATURE: 97 F | HEIGHT: 67 IN | BODY MASS INDEX: 31.58 KG/M2 | SYSTOLIC BLOOD PRESSURE: 118 MMHG | WEIGHT: 201.19 LBS | HEART RATE: 84 BPM | DIASTOLIC BLOOD PRESSURE: 82 MMHG

## 2024-09-18 DIAGNOSIS — Z11.3 SCREENING FOR STD (SEXUALLY TRANSMITTED DISEASE): ICD-10-CM

## 2024-09-18 DIAGNOSIS — Z86.19 HISTORY OF SYPHILIS: ICD-10-CM

## 2024-09-18 DIAGNOSIS — M54.31 RIGHT SIDED SCIATICA: ICD-10-CM

## 2024-09-18 DIAGNOSIS — R07.89 ATYPICAL CHEST PAIN: ICD-10-CM

## 2024-09-18 DIAGNOSIS — Z00.00 WELLNESS EXAMINATION: Primary | ICD-10-CM

## 2024-09-18 DIAGNOSIS — R10.9 ABDOMINAL CRAMPING: ICD-10-CM

## 2024-09-18 DIAGNOSIS — E78.00 ELEVATED CHOLESTEROL: Primary | ICD-10-CM

## 2024-09-18 DIAGNOSIS — N48.9 PENILE LESION: ICD-10-CM

## 2024-09-18 DIAGNOSIS — Z00.00 WELLNESS EXAMINATION: ICD-10-CM

## 2024-09-18 DIAGNOSIS — B07.9 VERRUCOUS SKIN LESION: ICD-10-CM

## 2024-09-18 DIAGNOSIS — K52.9 CHRONIC DIARRHEA: ICD-10-CM

## 2024-09-18 DIAGNOSIS — E83.52 HYPERCALCEMIA: ICD-10-CM

## 2024-09-18 DIAGNOSIS — K21.9 GASTROESOPHAGEAL REFLUX DISEASE, UNSPECIFIED WHETHER ESOPHAGITIS PRESENT: ICD-10-CM

## 2024-09-18 LAB
ALBUMIN SERPL-MCNC: 5.2 G/DL (ref 3.2–4.8)
ALBUMIN/GLOB SERPL: 1.5 {RATIO} (ref 1–2)
ALP LIVER SERPL-CCNC: 68 U/L
ALT SERPL-CCNC: 29 U/L
ANION GAP SERPL CALC-SCNC: 7 MMOL/L (ref 0–18)
AST SERPL-CCNC: 21 U/L (ref ?–34)
BASOPHILS # BLD AUTO: 0.05 X10(3) UL (ref 0–0.2)
BASOPHILS NFR BLD AUTO: 0.7 %
BILIRUB SERPL-MCNC: 0.3 MG/DL (ref 0.3–1.2)
BUN BLD-MCNC: 9 MG/DL (ref 9–23)
CALCIUM BLD-MCNC: 10.7 MG/DL (ref 8.7–10.4)
CHLORIDE SERPL-SCNC: 105 MMOL/L (ref 98–112)
CHOLEST SERPL-MCNC: 217 MG/DL (ref ?–200)
CO2 SERPL-SCNC: 29 MMOL/L (ref 21–32)
CREAT BLD-MCNC: 1.05 MG/DL
EGFRCR SERPLBLD CKD-EPI 2021: 95 ML/MIN/1.73M2 (ref 60–?)
EOSINOPHIL # BLD AUTO: 0.19 X10(3) UL (ref 0–0.7)
EOSINOPHIL NFR BLD AUTO: 2.8 %
ERYTHROCYTE [DISTWIDTH] IN BLOOD BY AUTOMATED COUNT: 12.3 %
FASTING PATIENT LIPID ANSWER: NO
FASTING STATUS PATIENT QL REPORTED: NO
GLOBULIN PLAS-MCNC: 3.5 G/DL (ref 2–3.5)
GLUCOSE BLD-MCNC: 89 MG/DL (ref 70–99)
HBV SURFACE AB SER QL: REACTIVE
HBV SURFACE AB SERPL IA-ACNC: 16.44 MIU/ML
HBV SURFACE AG SER-ACNC: 0.12 [IU]/L
HBV SURFACE AG SERPL QL IA: NONREACTIVE
HCT VFR BLD AUTO: 46.6 %
HCV AB SERPL QL IA: NONREACTIVE
HDLC SERPL-MCNC: 32 MG/DL (ref 40–59)
HGB BLD-MCNC: 15.8 G/DL
IMM GRANULOCYTES # BLD AUTO: 0.03 X10(3) UL (ref 0–1)
IMM GRANULOCYTES NFR BLD: 0.4 %
LDLC SERPL CALC-MCNC: 148 MG/DL (ref ?–100)
LYMPHOCYTES # BLD AUTO: 1.95 X10(3) UL (ref 1–4)
LYMPHOCYTES NFR BLD AUTO: 28.8 %
MCH RBC QN AUTO: 30.7 PG (ref 26–34)
MCHC RBC AUTO-ENTMCNC: 33.9 G/DL (ref 31–37)
MCV RBC AUTO: 90.5 FL
MONOCYTES # BLD AUTO: 0.39 X10(3) UL (ref 0.1–1)
MONOCYTES NFR BLD AUTO: 5.8 %
NEUTROPHILS # BLD AUTO: 4.17 X10 (3) UL (ref 1.5–7.7)
NEUTROPHILS # BLD AUTO: 4.17 X10(3) UL (ref 1.5–7.7)
NEUTROPHILS NFR BLD AUTO: 61.5 %
NONHDLC SERPL-MCNC: 185 MG/DL (ref ?–130)
OSMOLALITY SERPL CALC.SUM OF ELEC: 290 MOSM/KG (ref 275–295)
PLATELET # BLD AUTO: 266 10(3)UL (ref 150–450)
POTASSIUM SERPL-SCNC: 4.4 MMOL/L (ref 3.5–5.1)
PROT SERPL-MCNC: 8.7 G/DL (ref 5.7–8.2)
RBC # BLD AUTO: 5.15 X10(6)UL
SODIUM SERPL-SCNC: 141 MMOL/L (ref 136–145)
T PALLIDUM AB SER QL IA: REACTIVE
TRIGL SERPL-MCNC: 200 MG/DL (ref 30–149)
TSI SER-ACNC: 2.8 MIU/ML (ref 0.55–4.78)
VLDLC SERPL CALC-MCNC: 38 MG/DL (ref 0–30)
WBC # BLD AUTO: 6.8 X10(3) UL (ref 4–11)

## 2024-09-18 PROCEDURE — 80061 LIPID PANEL: CPT

## 2024-09-18 PROCEDURE — 86780 TREPONEMA PALLIDUM: CPT

## 2024-09-18 PROCEDURE — 80053 COMPREHEN METABOLIC PANEL: CPT

## 2024-09-18 PROCEDURE — 86592 SYPHILIS TEST NON-TREP QUAL: CPT

## 2024-09-18 PROCEDURE — 87591 N.GONORRHOEAE DNA AMP PROB: CPT

## 2024-09-18 PROCEDURE — 86706 HEP B SURFACE ANTIBODY: CPT

## 2024-09-18 PROCEDURE — 87389 HIV-1 AG W/HIV-1&-2 AB AG IA: CPT

## 2024-09-18 PROCEDURE — 86803 HEPATITIS C AB TEST: CPT

## 2024-09-18 PROCEDURE — 84443 ASSAY THYROID STIM HORMONE: CPT

## 2024-09-18 PROCEDURE — 87491 CHLMYD TRACH DNA AMP PROBE: CPT

## 2024-09-18 PROCEDURE — 99395 PREV VISIT EST AGE 18-39: CPT | Performed by: STUDENT IN AN ORGANIZED HEALTH CARE EDUCATION/TRAINING PROGRAM

## 2024-09-18 PROCEDURE — 99214 OFFICE O/P EST MOD 30 MIN: CPT | Performed by: STUDENT IN AN ORGANIZED HEALTH CARE EDUCATION/TRAINING PROGRAM

## 2024-09-18 PROCEDURE — 85025 COMPLETE CBC W/AUTO DIFF WBC: CPT

## 2024-09-18 PROCEDURE — 87340 HEPATITIS B SURFACE AG IA: CPT

## 2024-09-18 RX ORDER — MOXIFLOXACIN 5 MG/ML
1 SOLUTION/ DROPS OPHTHALMIC 3 TIMES DAILY
COMMUNITY
Start: 2024-09-16

## 2024-09-18 NOTE — PROGRESS NOTES
[facility]  09/18/24    Chief Complaint   Patient presents with    Physical     HPI:   Solomon Ceron is a 34 year old male who presents for a complete physical exam.     Noticed chest pain, reduced caffeine intake and stopped adderall. Echo was good. Still has to do holter and EKG. Still having chest pains. Herrin so bad at his brother's wedding that he thought he had to go to the hospital. Feels like a ball of pressure/discomfort in the middle of his chest.    Stopped coffee, cut back on alcohol this week in hopes of improving chest pain.    During work less pain, then if not busy will feel like he has to take a deep breath, worse when laying down at night as well. When running on the treadmill would be worried about his heartbeat as well.     Patient was diagnosed with syphilis 5/2024. Was advised to follow up with health department. He has a lesion still that is not healing. It seems like a scar. Sometimes will open a bit. Was given doxycycline for 30 days, then retest.     Has some bumps on the pubic area, unsure if warts or something different - there are four.    Some chronic back pain. Some right gluteus pain and sciatica pain.     Med/Surg/Allergy/Fam hx updates: denied  Home: going well  Work: stressful  Activities/Hobbies: yes  Diet: overeating at times  Exercise: less exercise  Sleep: toss and turns sometimes  Mood: feels downs due to health conditions; no depression, no suicidal thoughts  Habits: social alcohol, no tobacco or drug use  Sexual activity: no new partners  Immunizations: discussed  Screenings: discussed      Wt Readings from Last 6 Encounters:   09/18/24 201 lb 3.2 oz (91.3 kg)   05/28/24 205 lb (93 kg)   01/08/24 207 lb (93.9 kg)   12/22/23 207 lb (93.9 kg)   11/15/21 190 lb (86.2 kg)   10/19/21 197 lb (89.4 kg)     Body mass index is 31.51 kg/m².     Results for orders placed or performed in visit on 05/28/24   Hepatitis B Surface Antigen   Result Value Ref  Range    Hepatitis B Surface Antigen Nonreactive Nonreactive     Hbsag Screen Index <0.10    T Pallidum Screening [place]   Result Value Ref Range    Treponemal Antibodies Reactive (A) Nonreactive    Hepatitis B Surface Antibody   Result Value Ref Range    Hepatitis B Surface Antibody Reactive Reactive     Heptatitis B Surface Ab Quant 15.00 mIU/mL   HCV Antibody   Result Value Ref Range    Hepatitis C Virus Nonreactive Nonreactive    HIV Ag/Ab Combo   Result Value Ref Range    HIV Antigen Antibody Combo Non-Reactive Non-Reactive   RPR W/Reflex to Titer   Result Value Ref Range    Rapid Plasma Reagin Nonreactive Nonreactive   Syphilis Ab by TP-PA, Serum   Result Value Ref Range    Syphilis Ab by TP-PA, Serum Positive (A) Negative   Urine Chlamydia/GC Amplification    Specimen: Urine; Other   Result Value Ref Range    Chlamydia Trachomatis Amplified RNA Negative Negative    Neisseria Gonorrhoeae Amplified RNA Negative Negative    Chlam/GC Source Urine        Current Outpatient Medications   Medication Sig Dispense Refill    moxifloxacin 0.5 % Ophthalmic Solution Place 1 drop into both eyes 3 (three) times daily.      Omeprazole 40 MG Oral Capsule Delayed Release Take 1 capsule (40 mg total) by mouth daily. 90 capsule 0    Triamcinolone Acetonide 0.05 % External Ointment Apply 1 cm topically in the morning and 1 cm before bedtime. 45 g 0    amphetamine-dextroamphetamine 5 MG Oral Tab Take 2 tablets (10 mg total) by mouth 2 (two) times daily. 120 tablet 0    EPINEPHrine 0.3 MG/0.3ML Injection Solution Auto-injector       dicyclomine 10 MG Oral Cap Take 1 capsule (10 mg total) by mouth 4 (four) times daily as needed (cramping). 90 capsule 0    amphetamine-dextroamphetamine 10 MG Oral Tab Take 1 tablet (10 mg total) by mouth 2 (two) times daily.        No Known Allergies   Past Medical History:    Allergic rhinitis    Attention deficit hyperactivity disorder (ADHD)    Esophageal reflux    Sleep apnea      Past Surgical  History:   Procedure Laterality Date    Egd  11/2021    gastritis      Family History   Problem Relation Age of Onset    Other (Diverticulitis) Mother     Cancer Maternal Grandmother         lung    Heart Attack Paternal Grandfather     Diabetes Paternal Grandfather       Social History:  Social History     Socioeconomic History    Marital status: Single   Tobacco Use    Smoking status: Never     Passive exposure: Never    Smokeless tobacco: Never   Vaping Use    Vaping status: Never Used   Substance and Sexual Activity    Alcohol use: Yes     Alcohol/week: 5.0 - 10.0 standard drinks of alcohol     Types: 5 - 10 Cans of beer per week     Comment: socially    Drug use: Yes     Types: Cannabis     Comment: occasional    Sexual activity: Yes   Other Topics Concern    Caffeine Concern No    Sleep Concern Yes    Stress Concern No    Weight Concern No    Special Diet No    Exercise No    Seat Belt Yes         REVIEW OF SYSTEMS:   GENERAL: feels well otherwise  SKIN: see HPI  EYES:denies blurred vision or double vision  HEENT: denies nasal congestion, sinus pain or ST  LUNGS: denies shortness of breath with exertion, denies cough  CARDIOVASCULAR: see HPI  GI: denies abdominal pain,denies heartburn, denies n/v/d/c/blood in stool  : denies nocturia or changes in stream  MUSCULOSKELETAL: denies back pain  NEURO: denies headaches, denies LH/dizziness/syncope  PSYCHE: denies depression or anxiety  HEMATOLOGIC: denies hx of anemia  ENDOCRINE: denies thyroid history  ALL/ASTHMA: denies hx of allergy or asthma    EXAM:   /82   Pulse 84   Temp 97.3 °F (36.3 °C) (Temporal)   Resp 16   Ht 5' 7\" (1.702 m)   Wt 201 lb 3.2 oz (91.3 kg)   BMI 31.51 kg/m²   Body mass index is 31.51 kg/m².   GENERAL: well developed, well nourished,in no apparent distress  SKIN: no rashes,no suspicious lesions  HEENT: atraumatic, normocephalic,ears and throat are clear  EYES:PERRLA, EOMI, conjunctiva are clear  NECK: supple,no adenopathy,no  bruits, no thyromegaly  LUNGS: clear to auscultation, no r/r/w  CARDIO: RRR without murmur  GI: good BS's,no masses, HSM or tenderness  :  two descended testes,no masses,no hernia, +verrucous appearing lesions on pubic area, +pinky pearly papule with central umbilication on right head of the penis  RECTAL: deferred  MUSCULOSKELETAL: back is not tender,FROM of the back  EXTREMITIES: no cyanosis, clubbing or edema  NEURO: Oriented times three, cranial nerves are intact, motor and sensory are grossly intact; 2+ knee reflexes bilaterally  VASCULAR: 2+ posterior tibialis pulses bilaterally    ASSESSMENT AND PLAN:   Solomon Ceron is a 34 year old male who presents for a complete physical exam.      1. Wellness examination  - BP: at goal  - BMI: Body mass index is 31.51 kg/m²., c/w obesity, recommended low fat diet and aerobic exercise 30 minutes three times weekly.   - CBC With Differential With Platelet; Future  - Comp Metabolic Panel (14); Future  - TSH W Reflex To Free T4; Future  - Lipid Panel; Future    2. Atypical chest pain  Patient has atypical chest pain  - echo was normal  - will check stress test  - schedule EKG as well  - holter pending above at this time  - CARD TREADMILL STRESS, ADULT (CPT=93017); Future    3. History of syphilis  Patient has hx of syphilis  - will recheck RPR w reflex, was treated at health dept with doxy  - will refer to urology and ID, appreciate evaluation and recommendations  - Infectious Disease Referral - In Network  - RPR W/REF TO TITER & CONFIRM [[id number]][Q]; Future  - Urology Referral - In Network    4. Right sided sciatica  Patient has right sciatica  - will refer to physical therapy, appreciate evaluation and recommendations  - Physical Therapy Referral - [facility] Location    5. Screening for STD (sexually transmitted disease)  - T Pallidum Screening [place]; Future  - HIV AG AB Combo (Consent Obtained prechecked); Future  - Urine Chlamydia/GC Amplification; Future  -  Hepatitis B Surface Antibody; Future  - Hepatitis B Surface Antigen; Future  - HCV Antibody; Future    6. Chronic diarrhea  Patient has hx of IBS like symptoms, will refer to GI, appreciate evaluation and recommendations  - Gastro Referral - In Network    7. Abdominal cramping  - Gastro Referral - In Network    8. Gastroesophageal reflux disease, unspecified whether esophagitis present  Patient has hx of GERD, will refer to GI, appreciate evaluation and recommendations  - Gastro Referral - In Network    9. Penile lesion  Patient has residual lesion after syphilis treatment  - differential diagnosis includes scar tissue, incomplete response to treatment  - will recheck labs and refer to urology, appreciate evaluation and recommendations  - RPR W/REF TO TITER & CONFIRM [[id number]][Q]; Future  - Urology Referral - In Network    10. Verrucous skin lesion  Patient has skin lesions, differential diagnosis includes genital warts. Will refer to urology, appreciate evaluation and recommendations  - Urology Referral - In Network          The patient indicates understanding of these issues and agrees to the plan.      Health maintenance, will check:   Orders Placed This Encounter   Procedures    CBC With Differential With Platelet    Comp Metabolic Panel (14)    TSH W Reflex To Free T4    Lipid Panel    RPR W/REF TO TITER & CONFIRM [[id number]][Q]    T Pallidum Screening [place]    HIV AG AB Combo (Consent Obtained prechecked)    Hepatitis B Surface Antibody    Hepatitis B Surface Antigen    HCV Antibody    Urine Chlamydia/GC Amplification               Encounter Diagnoses   Name Primary?    Wellness examination Yes    Atypical chest pain     History of syphilis     Right sided sciatica     Screening for STD (sexually transmitted disease)     Chronic diarrhea     Abdominal cramping     Gastroesophageal reflux disease, unspecified whether esophagitis present     Penile lesion     Verrucous skin lesion        Meds & Refills for this  Visit:  Requested Prescriptions      No prescriptions requested or ordered in this encounter       Imaging & Consults:  INFECTIOUS DISEASE - INTERNAL  UROLOGY - INTERNAL  GASTRO - INTERNAL  OP REFERRAL TO [facility]  CARD TREADMILL STRESS, ADULT (CPT=93017)      Return in about 1 year (around 9/18/2025) for annual physical, or sooner if needed.        Denise Landry MD  [facility]  09/18/24    Please note that portions of this note may have been completed with a voice recognition program. Efforts were made to edit the dictations but occasionally words are mis-transcribed. Thank you for your understanding.    The 21st Century Cures Act makes medical notes like these available to patients in the interest of transparency. Please be advised this is a medical document. Medical documents are intended to carry relevant information, facts as evident, and the clinical opinion of the practitioner. The medical note is intended as peer to peer communication and may appear blunt or direct. It is written in medical language and may contain abbreviations or verbiage that are unfamiliar. If there are any questions or concerns please contact the provider for clarification.

## 2024-09-18 NOTE — PATIENT INSTRUCTIONS
Do EKG and stress test.    Refill policies:      Allow 3 business days for refills; controlled substances may take longer.  Contact your pharmacy at least 5-7 business days prior to running out of medication and have them send an electronic request or submit through the \"request refill\" option thru your [employer] account. No need to do both, as multiple requests will create an automated [employer] message to notify of a denial for one of the duplicated requests, causing you undue confusion.   Refills are NOT addressed on weekends; covering physicians do not authorize routine medications on weekends.  No narcotics or controlled substances are refilled after noon on Fridays or by on call physicians.  By law, narcotics cannot be faxed or phoned into your pharmacy.  If your prescription is due for a refill, you may be due for a follow up appointment. Please call our office at [phone] to make an appointment or schedule an appointment via [employer].  To best provide you care, patients receiving routine medications need to be seen at least twice a year. Patients receiving narcotic/controlled substance medications need to be seen at least once every 3 months.  In the event that your preferred pharmacy does not have the requested medication in stock (ie Backordered), it is your responsibility to find another pharmacy that has the requested medication available. We will gladly send a new prescription to that pharmacy at your request.  controlled substances may not be able to be filled out of state due to license restrictions.  If you have a planned trip, it's best to call your pharmacy at least 5-7 business days to prevent any delays in your medication refill.    Scheduling Tests:    If your physician has ordered radiology tests such as MRI or CT scans, please contact Central Scheduling at [phone] right away to schedule the test.  Once scheduled, the [facility] Centralized Referral Team will work with your insurance carrier to  obtain pre-certification or prior authorization.  Depending on your insurance carrier, approval may take 3-10 days.  It is highly recommended patients assure they have received an authorization before having a test performed.  If test is done without insurance authorization, patient may be responsible for the entire amount billed.      Precertification and Prior Authorizations:  If your physician has recommended that you have a procedure or additional testing performed the [facility] Centralized Referral Team will contact your insurance carrier to obtain pre-certification or prior authorization.    You are strongly encouraged to contact your insurance carrier to verify that your procedure/test has been approved and is a COVERED benefit.  Although the [facility] Centralized Referral Team does its due diligence, the insurance carrier gives the disclaimer that \"Although the procedure is authorized, this does not guarantee payment.\"    Ultimately the patient is responsible for payment.   Thank you for your understanding in this matter.  Paperwork Completion:  If you require FMLA or disability paperwork for your recovery, please make sure to either drop it off or have it faxed to our office at [phone]. Be sure the form has your name and date of birth on it.  The form will be faxed to our Forms Department and they will complete it for you.  There is a 25$ fee for all forms that need to be filled out.  Please be aware there is a 10-14 day turnaround time.  You will need to sign a release of information (JENNIFER) form if your paperwork does not come with one.  You may call the Forms Department with any questions at [phone].  Their fax number is [phone].

## 2024-09-19 LAB
C TRACH DNA SPEC QL NAA+PROBE: NEGATIVE
N GONORRHOEA DNA SPEC QL NAA+PROBE: NEGATIVE
RPR SER QL: NONREACTIVE
RPR SER QL: NONREACTIVE

## 2024-09-19 NOTE — TELEPHONE ENCOUNTER
Patient denies any calcium supplements at this time.    Per 9/17/24 lab results:    Calcium, Total  8.7 - 10.4 mg/dL 10.7 High

## 2024-09-19 NOTE — TELEPHONE ENCOUNTER
For the cholesterol I recommend low fat diet, using more healthy fats such as olive oil, avocado, fish in the diet. Having fiber in the diet can also help cholesterol levels. Let's repeat lipid panel in 3-4 months. For the calcium we can follow up with vitamin D level and PTH intact with calcium. Thank you.

## 2024-09-19 NOTE — TELEPHONE ENCOUNTER
From: Solomon Ceron  To: Denise Landry  Sent: 9/18/2024 6:29 PM CDT  Subject: Test Results    Hi Dr. Landry,    I do not take any supplements for calcium. The only supplement I take are fiber pills. I believe my calcium was up last time to. Is this a sign of anything?    Also should I consider taking anything for the high cholesterol to ensure the levels go down to a lower level?

## 2024-09-25 LAB
TREPONEMAL ANTIBODIES, TPPA: REACTIVE
TREPONEMAL ANTIBODIES, TPPA: REACTIVE

## 2024-10-14 ENCOUNTER — EKG ENCOUNTER (OUTPATIENT)
Dept: LAB | Age: 34
End: 2024-10-14
Attending: STUDENT IN AN ORGANIZED HEALTH CARE EDUCATION/TRAINING PROGRAM
Payer: COMMERCIAL

## 2024-10-14 ENCOUNTER — HOSPITAL ENCOUNTER (OUTPATIENT)
Dept: CV DIAGNOSTICS | Age: 34
Discharge: HOME | End: 2024-10-14
Attending: STUDENT IN AN ORGANIZED HEALTH CARE EDUCATION/TRAINING PROGRAM
Payer: COMMERCIAL

## 2024-10-14 DIAGNOSIS — R07.89 ATYPICAL CHEST PAIN: ICD-10-CM

## 2024-10-14 DIAGNOSIS — R00.2 INTERMITTENT PALPITATIONS: ICD-10-CM

## 2024-10-14 DIAGNOSIS — R06.00 PAROXYSMAL NOCTURNAL DYSPNEA: ICD-10-CM

## 2024-10-14 DIAGNOSIS — R06.02 SHORTNESS OF BREATH: ICD-10-CM

## 2024-10-14 LAB
ATRIAL RATE: 60 BPM
P AXIS: 58 DEGREES
P-R INTERVAL: 172 MS
Q-T INTERVAL: 404 MS
QRS DURATION: 98 MS
QTC CALCULATION (BEZET): 404 MS
R AXIS: 87 DEGREES
T AXIS: 49 DEGREES
VENTRICULAR RATE: 60 BPM

## 2024-10-14 PROCEDURE — 93005 ELECTROCARDIOGRAM TRACING: CPT

## 2024-10-14 PROCEDURE — 93017 CV STRESS TEST TRACING ONLY: CPT | Performed by: STUDENT IN AN ORGANIZED HEALTH CARE EDUCATION/TRAINING PROGRAM

## 2024-10-14 PROCEDURE — 93010 ELECTROCARDIOGRAM REPORT: CPT | Performed by: INTERNAL MEDICINE

## 2024-10-14 PROCEDURE — 93018 CV STRESS TEST I&R ONLY: CPT | Performed by: STUDENT IN AN ORGANIZED HEALTH CARE EDUCATION/TRAINING PROGRAM

## 2024-11-04 ENCOUNTER — OFFICE VISIT (OUTPATIENT)
Dept: SURGERY | Facility: CLINIC | Age: 34
End: 2024-11-04
Payer: COMMERCIAL

## 2024-11-04 DIAGNOSIS — N48.9 PENILE LESION: ICD-10-CM

## 2024-11-04 DIAGNOSIS — Q55.69 PERSISTENT FRENULUM OF PENIS: Primary | ICD-10-CM

## 2024-11-04 PROCEDURE — 99204 OFFICE O/P NEW MOD 45 MIN: CPT | Performed by: UROLOGY

## 2024-11-04 RX ORDER — CLOTRIMAZOLE AND BETAMETHASONE DIPROPIONATE 10; .64 MG/G; MG/G
1 CREAM TOPICAL 2 TIMES DAILY
Qty: 45 G | Refills: 5 | Status: SHIPPED | OUTPATIENT
Start: 2024-11-04

## 2024-11-04 NOTE — PROGRESS NOTES
Urology Clinic Note - New Patient    Referring Provider:  No referring provider defined for this encounter.     Primary Care Provider:  Denise Landry MD     Chief Complaint:   Penile lesion     HPI:     Solomon Ceron is a 34 year old male with history of BRUCE, GERD, ADHD referred for penile lesion.     Saw [facility] in 2018- had a penile lesion; had history of genital herpes.   Was given betamethasone for balanitis.   Told to continue Valtrex for herpes history.   Patient was diagnosed with syphilis 5/2024. Was advised to follow up with health department. He has a lesion still that is not healing. It seems like a scar. Sometimes will open a bit. Was given doxycycline for 30 days per PCP; was then rechecked and RPR was negative (successful treatment).   At last visit with PCP;   +verrucous appearing lesions on pubic area, +pinky pearly papule with central umbilication on right head of the penis  Referral to urology and ID placed; here for evaluation now.      Today;   Patient saw dermatology last week; all herpes related lesions were treated with cryo without issue. He has ongoing tiny area or dry skin near corona . He states this was where the syphilis lesion above was noted. It has completely resolved but has left some white pigmentation which some times causes pain with erections. He has no systemic symptoms. No ongoing urinary issues.   No other urologic history. He also notes long history of tight frenulum which also causes pain with intercourse- he was circumcised at birth.     PSA:  No results found for: \"PSA\", \"PERCENTPSA\", \"PSAS\", \"PSAULTRA\", \"QPSA\", \"PSATOT\", \"TOTPSADX\", \"TOTPSASCREEN\"   Last Cr was 1.05 done on 9/18/2024.      History:     Past Medical History:    Allergic rhinitis    Attention deficit hyperactivity disorder (ADHD)    Esophageal reflux    Sleep apnea       Past Surgical History:   Procedure Laterality Date    Egd  11/2021    gastritis       Family History   Problem Relation  Age of Onset    Other (Diverticulitis) Mother     Cancer Maternal Grandmother         lung    Heart Attack Paternal Grandfather     Diabetes Paternal Grandfather        Social History     Socioeconomic History    Marital status: Single   Tobacco Use    Smoking status: Never     Passive exposure: Never    Smokeless tobacco: Never   Vaping Use    Vaping status: Never Used   Substance and Sexual Activity    Alcohol use: Yes     Alcohol/week: 5.0 - 10.0 standard drinks of alcohol     Types: 5 - 10 Cans of beer per week     Comment: socially    Drug use: Yes     Types: Cannabis     Comment: occasional    Sexual activity: Yes   Other Topics Concern    Caffeine Concern No    Sleep Concern Yes    Stress Concern No    Weight Concern No    Special Diet No    Exercise No    Seat Belt Yes       Medications (Active prior to today's visit):  Current Outpatient Medications   Medication Sig Dispense Refill    moxifloxacin 0.5 % Ophthalmic Solution Place 1 drop into both eyes 3 (three) times daily.      Omeprazole 40 MG Oral Capsule Delayed Release Take 1 capsule (40 mg total) by mouth daily. 90 capsule 0    Triamcinolone Acetonide 0.05 % External Ointment Apply 1 cm topically in the morning and 1 cm before bedtime. 45 g 0    amphetamine-dextroamphetamine 5 MG Oral Tab Take 2 tablets (10 mg total) by mouth 2 (two) times daily. 120 tablet 0    EPINEPHrine 0.3 MG/0.3ML Injection Solution Auto-injector       dicyclomine 10 MG Oral Cap Take 1 capsule (10 mg total) by mouth 4 (four) times daily as needed (cramping). 90 capsule 0    amphetamine-dextroamphetamine 10 MG Oral Tab Take 1 tablet (10 mg total) by mouth 2 (two) times daily.         Allergies:  Allergies[1]      Review of Systems:   A comprehensive 10-point review of systems was completed.  Pertinent positives and negatives are noted in the the HPI.    Physical Exam:   CONSTITUTIONAL: Well developed, well nourished, in no acute distress  NEUROLOGIC: Alert and oriented  ABDOMEN:  Soft, non-tender, non-distended,    GENITOURINARY: Normal cirmcumcised phallus with slightly tight frenulum; tiny ~3mm area of dry white pigmented skin along the 11 oclock position of distal shaft near the corona without active drainage or redness or tenderness; mild redudant foreskin despite circumcision   orthotopic meatus, normal bilateral testicles   Well healed areas of cryosurgery along suprapubic area, penis, scrotum.   No results found.      Assessment & Plan:     # penile lesion  Healed syphilis lesion (scar); patient reports lesion completely healed but now with some dry skin that often cracks and gets red with irritation. Of note, he has some redundant foreskin that covers this area. I discussed that he may have some mild balanitis to the area given history and hygiene due to mild excess skin. We discussed trial of lotrisone cream. He will trial this for 4 weeks. Discussed hygiene as well.   I otherwise reassured him that I do not see any active signs of infection or other worrisome findings.     # tight frenulum   Discussed options including obersvation, surgery (frenulectomy). Discussed surgery in detail including risks. Reading information was sent.   He wants to see how the above issue resolves before deciding but is leaning towards surgery (likely under local) for management in the future.          Thank you for this consult.    I have personally reviewed all relevant medical records, labs, and imaging.        Vaibhav Patel MD  Staff Urologist  [facility]  Office: [phone]           [1] No Known Allergies

## 2024-12-16 ENCOUNTER — OFFICE VISIT (OUTPATIENT)
Dept: SURGERY | Facility: CLINIC | Age: 34
End: 2024-12-16

## 2024-12-16 DIAGNOSIS — Q55.69 PERSISTENT FRENULUM OF PENIS: Primary | ICD-10-CM

## 2024-12-16 DIAGNOSIS — N48.9 PENILE LESION: ICD-10-CM

## 2024-12-16 PROCEDURE — 99215 OFFICE O/P EST HI 40 MIN: CPT | Performed by: UROLOGY

## 2024-12-16 NOTE — PROGRESS NOTES
Urology Clinic Note    Primary Care Provider:  Denise Landry MD     Chief Complaint:   Tight penile frenulum, penile lesion    HPI:      Solomon Ceron is a 34 year old male with history of BRUCE, GERD, ADHD referred for penile lesion.      Saw [facility] in 2018- had a penile lesion; had history of genital herpes.   Was given betamethasone for balanitis.   Told to continue Valtrex for herpes history.   Patient was diagnosed with syphilis 5/2024. Was advised to follow up with health department. He has a lesion still that is not healing. It seems like a scar. Sometimes will open a bit. Was given doxycycline for 30 days per PCP; was then rechecked and RPR was negative (successful treatment).   At last visit with PCP;   +verrucous appearing lesions on pubic area, +pinky pearly papule with central umbilication on right head of the penis  Referral to urology and ID placed; here for evaluation now.      He saw me 11/4/24.   He had seen dermatology before this; all herpes related lesions were treated with cryo without issue. He has ongoing tiny area or dry skin near corona . He states this was where the syphilis lesion above was noted. It has completely resolved but has left some white pigmentation which some times causes pain with erections. He has no systemic symptoms. No ongoing urinary issues.   No other urologic history. He also notes long history of tight frenulum which also causes pain with intercourse- he was circumcised at birth.    At last visit; on exam; Normal cirmcumcised phallus with slightly tight frenulum; tiny ~3mm area of dry white pigmented skin along the 11 oclock position of distal shaft near the corona without active drainage or redness or tenderness; mild redudant foreskin despite circumcision   orthotopic meatus, normal bilateral testicles   Well healed areas of cryosurgery along suprapubic area, penis, scrotum.     He was given Lotrisone cream for the above dry white pigmented area.  He  now returns for follow-up.  He reports using the Lotrisone course but had no improvement in his symptoms.  Again, he has a tiny little area of scar tissue from previous lesion.  He continues to have some discomfort with his frenulum of the penis.         PSA:  No results found for: \"PSA\", \"PERCENTPSA\", \"PSAS\", \"PSAULTRA\", \"QPSA\", \"PSATOT\", \"TOTPSADX\", \"TOTPSASCREEN\"     History:     Past Medical History:    Allergic rhinitis    Attention deficit hyperactivity disorder (ADHD)    Back pain    Bloating    Chest pain    Esophageal reflux    Flatulence/gas pain/belching    Food intolerance    Heartburn    Hemorrhoids    High cholesterol    Irregular bowel habits    Shortness of breath    Sleep apnea    Sleep disturbance    Wears glasses       Past Surgical History:   Procedure Laterality Date    Egd  11/2021    gastritis       Family History   Problem Relation Age of Onset    Other (Diverticulitis) Mother     Cancer Maternal Grandmother         lung    Heart Attack Paternal Grandfather     Diabetes Paternal Grandfather        Social History     Socioeconomic History    Marital status: Single   Tobacco Use    Smoking status: Never     Passive exposure: Never    Smokeless tobacco: Never   Vaping Use    Vaping status: Never Used   Substance and Sexual Activity    Alcohol use: Yes     Alcohol/week: 5.0 standard drinks of alcohol     Types: 5 Cans of beer per week     Comment: socially    Drug use: Yes     Types: Cannabis     Comment: occasional    Sexual activity: Yes   Other Topics Concern    Caffeine Concern No    Sleep Concern Yes    Stress Concern No    Weight Concern No    Special Diet No    Exercise No    Seat Belt Yes       Medications (Active prior to today's visit):  Current Outpatient Medications   Medication Sig Dispense Refill    Omeprazole 40 MG Oral Capsule Delayed Release Take 1 capsule (40 mg total) by mouth daily. 90 capsule 0    Tirzepatide-Weight Management (ZEPBOUND) 5 MG/0.5ML Subcutaneous Solution  Auto-injector Inject 5 mg into the skin once a week for 4 doses. 2 mL 0    dicyclomine 10 MG Oral Cap Take 1 capsule (10 mg total) by mouth 4 (four) times daily as needed (cramping). 90 capsule 0    Tirzepatide-Weight Management (ZEPBOUND) 2.5 MG/0.5ML Subcutaneous Solution Auto-injector Inject 2.5 mg into the skin once a week for 4 doses. 2 mL 0    amphetamine-dextroamphetamine (ADDERALL) 10 MG Oral Tab Take 1 tablet (10 mg total) by mouth 2 (two) times daily. 60 tablet 0    [START ON 12/26/2024] amphetamine-dextroamphetamine (ADDERALL) 10 MG Oral Tab Take 1 tablet (10 mg total) by mouth 2 (two) times daily. 60 tablet 0    [START ON 1/26/2025] amphetamine-dextroamphetamine (ADDERALL) 10 MG Oral Tab Take 1 tablet (10 mg total) by mouth 2 (two) times daily. 60 tablet 0    clotrimazole-betamethasone 1-0.05 % External Cream Apply 1 Application topically 2 (two) times daily. Apply to affected area for 3-4 weekss, then stop. Shower/clean area daily.  If disorder recurs in the future, please restart medication 45 g 5    EPINEPHrine 0.3 MG/0.3ML Injection Solution Auto-injector       amphetamine-dextroamphetamine 10 MG Oral Tab Take 1 tablet (10 mg total) by mouth 2 (two) times daily.         Allergies:  Allergies[1]    Review of Systems:   A comprehensive 10-point review of systems was completed.  Pertinent positives and negatives are noted in the the HPI.    Physical Exam:   CONSTITUTIONAL: Well developed, well nourished, in no acute distress  ABDOMEN: Soft, non-tender, non-distended  GENITOURINARY: Normal cirmcumcised phallus with mild to moderate tight frenulum; tiny ~3mm area of dry white pigmented skin along the 11 oclock position of distal shaft near the corona without active drainage or redness or tenderness; mild redudant foreskin; orthotopic meatus, normal bilateral testicles       Assessment & Plan:     Penile lesion  Patient is slightly bothered by above area of pale scar tissue along the area of his previous  lesion.  This is about 2 to 3 mm in size.  There is no area of fluctuance.  No pain to palpation.  He has some mild discomfort with erections.  I discussed ongoing conservative management for this area as I do not think excision of this area would likely help his symptoms.  Could lead to further scar tissue which would make symptoms worse.  He is in agreement with this.  He has a dermatologist as well who he can discuss with.    Penile frenulum  Discussed the anatomy of the frenulum in detail.  Discussed potential symptoms associated with the frenulum.  Patient has slight pain with erections, no bleeding or tearing in this area.  I discussed potential treatments including stretching exercises, steroid creams, frenuloplasty, frenulectomy.  We also discussed circumcision revision, although he does not have much redundant skin.   No improvement with steroid ointment at this point.     We discussed options including observation versus surgery.  He would like to proceed with penile frenulectomy.  We discussed doing this under local anesthesia versus in the OR, he would like to trial local.  We discussed the risks of the procedure that include but are not limited to postoperative pain, infection, bleeding, scarring, changes to sexual function, change in sensation, wound complications, risk of recurrence.  We discussed her recovery time.  We discussed postoperative care and expectations.  Will schedule.      In total, 40 minutes were spent on this patient encounter (including chart review, patient history, physical, and counseling, documentation, and communication).    Vaibhav Patel MD  Staff Urologist  [facility]  Office: [phone]         [1] No Known Allergies

## 2025-01-02 PROBLEM — K21.9 GASTROESOPHAGEAL REFLUX DISEASE: Status: ACTIVE | Noted: 2025-01-02

## 2025-01-04 DIAGNOSIS — E66.811 CLASS 1 OBESITY WITH SERIOUS COMORBIDITY AND BODY MASS INDEX (BMI) OF 31.0 TO 31.9 IN ADULT, UNSPECIFIED OBESITY TYPE: ICD-10-CM

## 2025-01-09 RX ORDER — TIRZEPATIDE 5 MG/.5ML
INJECTION, SOLUTION SUBCUTANEOUS
Qty: 2 ML | Refills: 0 | Status: SHIPPED | OUTPATIENT
Start: 2025-01-09

## 2025-01-09 NOTE — TELEPHONE ENCOUNTER
Last Office Visit: 2024    Medication Quantity Refills Start End   Tirzepatide-Weight Management (ZEPBOUND) 5 MG/0.5ML Subcutaneous Solution Auto-injector () 2 mL 0 2024     Return to Clinic: 2025    Protocol: n/a    Refill pended. Please approve if okay. Thank you.

## 2025-02-03 DIAGNOSIS — E66.811 CLASS 1 OBESITY WITH SERIOUS COMORBIDITY AND BODY MASS INDEX (BMI) OF 31.0 TO 31.9 IN ADULT, UNSPECIFIED OBESITY TYPE: ICD-10-CM

## 2025-02-05 RX ORDER — TIRZEPATIDE 5 MG/.5ML
INJECTION, SOLUTION SUBCUTANEOUS
Qty: 2 ML | Refills: 0 | Status: SHIPPED | OUTPATIENT
Start: 2025-02-05 | End: 2025-03-12

## 2025-02-05 NOTE — TELEPHONE ENCOUNTER
Last Office Visit: 11/25/2024    Last Refill:   Medication Quantity Refills Start End   ZEPBOUND 5 MG/0.5ML Subcutaneous Solution Auto-injector 2 mL 0 1/9/2025 --     Return to Clinic: 05/25/2025    Protocol:     Refill pended. Please approve if okay. Thank you.

## 2025-02-07 ENCOUNTER — PATIENT MESSAGE (OUTPATIENT)
Dept: FAMILY MEDICINE CLINIC | Facility: CLINIC | Age: 35
End: 2025-02-07

## 2025-02-07 DIAGNOSIS — M54.31 RIGHT SIDED SCIATICA: Primary | ICD-10-CM

## 2025-02-19 ENCOUNTER — TELEPHONE (OUTPATIENT)
Dept: SURGERY | Facility: CLINIC | Age: 35
End: 2025-02-19

## 2025-02-19 ENCOUNTER — APPOINTMENT (OUTPATIENT)
Dept: CT IMAGING | Age: 35
End: 2025-02-19
Attending: STUDENT IN AN ORGANIZED HEALTH CARE EDUCATION/TRAINING PROGRAM

## 2025-02-19 ENCOUNTER — HOSPITAL ENCOUNTER (EMERGENCY)
Age: 35
Discharge: HOME | End: 2025-02-19
Attending: STUDENT IN AN ORGANIZED HEALTH CARE EDUCATION/TRAINING PROGRAM

## 2025-02-19 VITALS
WEIGHT: 184.08 LBS | DIASTOLIC BLOOD PRESSURE: 89 MMHG | RESPIRATION RATE: 19 BRPM | TEMPERATURE: 97.7 F | SYSTOLIC BLOOD PRESSURE: 128 MMHG | HEART RATE: 80 BPM | OXYGEN SATURATION: 99 %

## 2025-02-19 DIAGNOSIS — K57.92 ACUTE DIVERTICULITIS: ICD-10-CM

## 2025-02-19 DIAGNOSIS — R10.32 ACUTE ABDOMINAL PAIN IN LEFT LOWER QUADRANT: Primary | ICD-10-CM

## 2025-02-19 DIAGNOSIS — Q55.69 PERSISTENT FRENULUM OF PENIS: Primary | ICD-10-CM

## 2025-02-19 LAB
ALBUMIN SERPL-MCNC: 4.2 G/DL (ref 3.4–5)
ALBUMIN/GLOB SERPL: 1 {RATIO} (ref 1–2.4)
ALP SERPL-CCNC: 60 UNITS/L (ref 45–117)
ALT SERPL-CCNC: 29 UNITS/L
ANION GAP SERPL CALC-SCNC: 9 MMOL/L (ref 7–19)
AST SERPL-CCNC: 12 UNITS/L
BASOPHILS # BLD: 0.1 K/MCL (ref 0–0.3)
BASOPHILS NFR BLD: 1 %
BILIRUB SERPL-MCNC: 0.4 MG/DL (ref 0.2–1)
BUN SERPL-MCNC: 8 MG/DL (ref 6–20)
BUN/CREAT SERPL: 9 (ref 7–25)
CALCIUM SERPL-MCNC: 9.6 MG/DL (ref 8.4–10.2)
CHLORIDE SERPL-SCNC: 105 MMOL/L (ref 97–110)
CO2 SERPL-SCNC: 29 MMOL/L (ref 21–32)
CREAT SERPL-MCNC: 0.87 MG/DL (ref 0.67–1.17)
DEPRECATED RDW RBC: 40.5 FL (ref 39–50)
EGFRCR SERPLBLD CKD-EPI 2021: >90 ML/MIN/{1.73_M2}
EOSINOPHIL # BLD: 0.1 K/MCL (ref 0–0.5)
EOSINOPHIL NFR BLD: 2 %
ERYTHROCYTE [DISTWIDTH] IN BLOOD: 12.5 % (ref 11–15)
FASTING DURATION TIME PATIENT: ABNORMAL H
GLOBULIN SER-MCNC: 4.3 G/DL (ref 2–4)
GLUCOSE SERPL-MCNC: 86 MG/DL (ref 70–99)
HCT VFR BLD CALC: 45.3 % (ref 39–51)
HGB BLD-MCNC: 15.2 G/DL (ref 13–17)
IMM GRANULOCYTES # BLD AUTO: 0 K/MCL (ref 0–0.2)
IMM GRANULOCYTES # BLD: 0 %
LIPASE SERPL-CCNC: 37 UNITS/L (ref 15–77)
LYMPHOCYTES # BLD: 2.6 K/MCL (ref 1–4.8)
LYMPHOCYTES NFR BLD: 27 %
MCH RBC QN AUTO: 29.3 PG (ref 26–34)
MCHC RBC AUTO-ENTMCNC: 33.6 G/DL (ref 32–36.5)
MCV RBC AUTO: 87.5 FL (ref 78–100)
MONOCYTES # BLD: 0.7 K/MCL (ref 0.3–0.9)
MONOCYTES NFR BLD: 7 %
NEUTROPHILS # BLD: 5.9 K/MCL (ref 1.8–7.7)
NEUTROPHILS NFR BLD: 63 %
NRBC BLD MANUAL-RTO: 0 /100 WBC
PLATELET # BLD AUTO: 280 K/MCL (ref 140–450)
POTASSIUM SERPL-SCNC: 3.9 MMOL/L (ref 3.4–5.1)
PROT SERPL-MCNC: 8.5 G/DL (ref 6.4–8.2)
RBC # BLD: 5.18 MIL/MCL (ref 4.5–5.9)
SODIUM SERPL-SCNC: 139 MMOL/L (ref 135–145)
WBC # BLD: 9.4 K/MCL (ref 4.2–11)

## 2025-02-19 PROCEDURE — 10002805 HB CONTRAST AGENT: Performed by: STUDENT IN AN ORGANIZED HEALTH CARE EDUCATION/TRAINING PROGRAM

## 2025-02-19 PROCEDURE — 10002807 HB RX 258: Performed by: STUDENT IN AN ORGANIZED HEALTH CARE EDUCATION/TRAINING PROGRAM

## 2025-02-19 PROCEDURE — 10002800 HB RX 250 W HCPCS: Performed by: STUDENT IN AN ORGANIZED HEALTH CARE EDUCATION/TRAINING PROGRAM

## 2025-02-19 PROCEDURE — 96374 THER/PROPH/DIAG INJ IV PUSH: CPT

## 2025-02-19 PROCEDURE — 10002803 HB RX 637: Performed by: STUDENT IN AN ORGANIZED HEALTH CARE EDUCATION/TRAINING PROGRAM

## 2025-02-19 PROCEDURE — 85025 COMPLETE CBC W/AUTO DIFF WBC: CPT | Performed by: STUDENT IN AN ORGANIZED HEALTH CARE EDUCATION/TRAINING PROGRAM

## 2025-02-19 PROCEDURE — 80053 COMPREHEN METABOLIC PANEL: CPT | Performed by: STUDENT IN AN ORGANIZED HEALTH CARE EDUCATION/TRAINING PROGRAM

## 2025-02-19 PROCEDURE — 96361 HYDRATE IV INFUSION ADD-ON: CPT

## 2025-02-19 PROCEDURE — 83690 ASSAY OF LIPASE: CPT | Performed by: STUDENT IN AN ORGANIZED HEALTH CARE EDUCATION/TRAINING PROGRAM

## 2025-02-19 PROCEDURE — 74177 CT ABD & PELVIS W/CONTRAST: CPT

## 2025-02-19 PROCEDURE — 99284 EMERGENCY DEPT VISIT MOD MDM: CPT

## 2025-02-19 PROCEDURE — 36415 COLL VENOUS BLD VENIPUNCTURE: CPT | Performed by: STUDENT IN AN ORGANIZED HEALTH CARE EDUCATION/TRAINING PROGRAM

## 2025-02-19 RX ORDER — DIAZEPAM 10 MG/1
10 TABLET ORAL SEE ADMIN INSTRUCTIONS
Qty: 1 TABLET | Refills: 0 | Status: SHIPPED | OUTPATIENT
Start: 2025-02-19

## 2025-02-19 RX ORDER — HYDROCODONE BITARTRATE AND ACETAMINOPHEN 5; 325 MG/1; MG/1
1 TABLET ORAL EVERY 6 HOURS PRN
Qty: 8 TABLET | Refills: 0 | Status: SHIPPED | OUTPATIENT
Start: 2025-02-19

## 2025-02-19 RX ORDER — TIRZEPATIDE 5 MG/.5ML
INJECTION, SOLUTION SUBCUTANEOUS
COMMUNITY

## 2025-02-19 RX ORDER — TIRZEPATIDE 2.5 MG/.5ML
INJECTION, SOLUTION SUBCUTANEOUS
COMMUNITY
Start: 2024-11-26

## 2025-02-19 RX ORDER — METRONIDAZOLE 500 MG/1
500 TABLET ORAL ONCE
Status: COMPLETED | OUTPATIENT
Start: 2025-02-19 | End: 2025-02-19

## 2025-02-19 RX ORDER — DEXTROAMPHETAMINE SACCHARATE, AMPHETAMINE ASPARTATE, DEXTROAMPHETAMINE SULFATE AND AMPHETAMINE SULFATE 2.5; 2.5; 2.5; 2.5 MG/1; MG/1; MG/1; MG/1
10 TABLET ORAL
COMMUNITY
Start: 2025-01-26 | End: 2025-02-25

## 2025-02-19 RX ORDER — KETOROLAC TROMETHAMINE 15 MG/ML
15 INJECTION, SOLUTION INTRAMUSCULAR; INTRAVENOUS ONCE
Status: COMPLETED | OUTPATIENT
Start: 2025-02-19 | End: 2025-02-19

## 2025-02-19 RX ORDER — METRONIDAZOLE 500 MG/1
500 TABLET ORAL 2 TIMES DAILY
Qty: 14 TABLET | Refills: 0 | Status: SHIPPED | OUTPATIENT
Start: 2025-02-19 | End: 2025-02-26

## 2025-02-19 RX ORDER — OMEPRAZOLE 40 MG/1
1 CAPSULE, DELAYED RELEASE ORAL DAILY
COMMUNITY
Start: 2024-12-10 | End: 2025-03-10

## 2025-02-19 RX ADMIN — METRONIDAZOLE 500 MG: 500 TABLET ORAL at 23:35

## 2025-02-19 RX ADMIN — SODIUM CHLORIDE 1000 ML: 9 INJECTION, SOLUTION INTRAVENOUS at 22:18

## 2025-02-19 RX ADMIN — KETOROLAC TROMETHAMINE 15 MG: 15 INJECTION, SOLUTION INTRAMUSCULAR; INTRAVENOUS at 22:16

## 2025-02-19 RX ADMIN — AMOXICILLIN AND CLAVULANATE POTASSIUM 1 TABLET: 875; 125 TABLET, FILM COATED ORAL at 23:35

## 2025-02-19 RX ADMIN — IOHEXOL 80 ML: 350 INJECTION, SOLUTION INTRAVENOUS at 22:47

## 2025-02-19 ASSESSMENT — ENCOUNTER SYMPTOMS
NAUSEA: 0
VOMITING: 0
DIZZINESS: 0
VOICE CHANGE: 0
CHILLS: 0
ABDOMINAL PAIN: 1
PHOTOPHOBIA: 0
BACK PAIN: 0
NUMBNESS: 0
NERVOUS/ANXIOUS: 0
WEAKNESS: 0
FEVER: 0
WOUND: 0
COUGH: 0
SHORTNESS OF BREATH: 0
CONFUSION: 0

## 2025-02-19 ASSESSMENT — PAIN SCALES - GENERAL
PAINLEVEL_OUTOF10: 5
PAINLEVEL_OUTOF10: 5

## 2025-02-19 NOTE — TELEPHONE ENCOUNTER
Called patient and offered Valium before his procedure.  He would like this.  Risks and benefits discussed.  He will need a ride for his procedure.  All questions answered.  Medication sent.  We will proceed with procedure on Friday as planned.

## 2025-02-20 ENCOUNTER — TELEPHONE (OUTPATIENT)
Dept: SURGERY | Facility: CLINIC | Age: 35
End: 2025-02-20

## 2025-02-20 LAB
RAINBOW EXTRA TUBES HOLD SPECIMEN: NORMAL
RAINBOW EXTRA TUBES HOLD SPECIMEN: NORMAL

## 2025-02-21 ENCOUNTER — OFFICE VISIT (OUTPATIENT)
Dept: SLEEP CENTER | Age: 35
End: 2025-02-21
Attending: STUDENT IN AN ORGANIZED HEALTH CARE EDUCATION/TRAINING PROGRAM
Payer: COMMERCIAL

## 2025-02-21 DIAGNOSIS — R06.00 PAROXYSMAL NOCTURNAL DYSPNEA: ICD-10-CM

## 2025-02-21 DIAGNOSIS — R00.2 INTERMITTENT PALPITATIONS: ICD-10-CM

## 2025-02-21 DIAGNOSIS — R06.83 SNORING: ICD-10-CM

## 2025-02-21 PROCEDURE — 95810 POLYSOM 6/> YRS 4/> PARAM: CPT

## 2025-02-21 NOTE — TELEPHONE ENCOUNTER
Patient being treated for diverticulitis. Will reschedule procedure. /nurses, please call patient and find a date at least 2 weeks from now for his procedure.   Thanks  SD

## 2025-03-07 ENCOUNTER — SLEEP STUDY (OUTPATIENT)
Facility: CLINIC | Age: 35
End: 2025-03-07
Payer: COMMERCIAL

## 2025-03-07 DIAGNOSIS — G47.30 SLEEP APNEA, UNSPECIFIED TYPE: Primary | ICD-10-CM

## 2025-03-07 PROCEDURE — 95810 POLYSOM 6/> YRS 4/> PARAM: CPT | Performed by: INTERNAL MEDICINE

## 2025-03-09 DIAGNOSIS — E66.811 CLASS 1 OBESITY WITH SERIOUS COMORBIDITY AND BODY MASS INDEX (BMI) OF 31.0 TO 31.9 IN ADULT, UNSPECIFIED OBESITY TYPE: ICD-10-CM

## 2025-03-11 NOTE — TELEPHONE ENCOUNTER
Last Office Visit: 11/25/2024    Last Refill:   Medication Quantity Refills Start End   ZEPBOUND 5 MG/0.5ML Subcutaneous Solution Auto-injector 2 mL 0 2/5/2025 --     Return to Clinic: 05/25/2025    Protocol: n/a    Refill pended. Please approve if okay. Thank you.

## 2025-03-12 RX ORDER — TIRZEPATIDE 5 MG/.5ML
INJECTION, SOLUTION SUBCUTANEOUS
Qty: 4 ML | Refills: 0 | Status: SHIPPED | OUTPATIENT
Start: 2025-03-12

## 2025-04-18 DIAGNOSIS — E66.811 CLASS 1 OBESITY WITH SERIOUS COMORBIDITY AND BODY MASS INDEX (BMI) OF 31.0 TO 31.9 IN ADULT, UNSPECIFIED OBESITY TYPE: ICD-10-CM

## 2025-04-18 NOTE — TELEPHONE ENCOUNTER
Last Office Visit: 11/25/2024    Last Refill:   Medication Quantity Refills Start End   ZEPBOUND 5 MG/0.5ML Subcutaneous Solution Auto-injector 4 mL 0 3/12/2025 --     Return to Clinic: 05/25/2025    Protocol: n/a    Refill pended. Please approve if okay. Thank you.

## 2025-04-21 RX ORDER — TIRZEPATIDE 5 MG/.5ML
INJECTION, SOLUTION SUBCUTANEOUS
Qty: 2 ML | Refills: 0 | OUTPATIENT
Start: 2025-04-21

## 2025-05-30 ENCOUNTER — OFFICE VISIT (OUTPATIENT)
Dept: FAMILY MEDICINE CLINIC | Facility: CLINIC | Age: 35
End: 2025-05-30
Payer: COMMERCIAL

## 2025-05-30 VITALS
BODY MASS INDEX: 28.41 KG/M2 | WEIGHT: 181 LBS | RESPIRATION RATE: 18 BRPM | OXYGEN SATURATION: 97 % | HEIGHT: 67 IN | SYSTOLIC BLOOD PRESSURE: 114 MMHG | DIASTOLIC BLOOD PRESSURE: 70 MMHG | HEART RATE: 97 BPM | TEMPERATURE: 98 F

## 2025-05-30 DIAGNOSIS — F90.0 ATTENTION DEFICIT HYPERACTIVITY DISORDER (ADHD), PREDOMINANTLY INATTENTIVE TYPE: ICD-10-CM

## 2025-05-30 PROCEDURE — 99214 OFFICE O/P EST MOD 30 MIN: CPT | Performed by: FAMILY MEDICINE

## 2025-05-30 RX ORDER — DEXTROAMPHETAMINE SACCHARATE, AMPHETAMINE ASPARTATE, DEXTROAMPHETAMINE SULFATE AND AMPHETAMINE SULFATE 2.5; 2.5; 2.5; 2.5 MG/1; MG/1; MG/1; MG/1
10 TABLET ORAL 2 TIMES DAILY
Qty: 60 TABLET | Refills: 0 | Status: SHIPPED | OUTPATIENT
Start: 2025-05-30 | End: 2025-06-29

## 2025-05-30 RX ORDER — PODOFILOX 5 MG/ML
SOLUTION TOPICAL
COMMUNITY
Start: 2025-05-24

## 2025-05-30 RX ORDER — GABAPENTIN 300 MG/1
CAPSULE ORAL
COMMUNITY
Start: 2025-05-24

## 2025-05-30 RX ORDER — DEXTROAMPHETAMINE SACCHARATE, AMPHETAMINE ASPARTATE, DEXTROAMPHETAMINE SULFATE AND AMPHETAMINE SULFATE 2.5; 2.5; 2.5; 2.5 MG/1; MG/1; MG/1; MG/1
10 TABLET ORAL 2 TIMES DAILY
Qty: 60 TABLET | Refills: 0 | Status: SHIPPED | OUTPATIENT
Start: 2025-06-30 | End: 2025-07-30

## 2025-05-30 RX ORDER — DEXTROAMPHETAMINE SACCHARATE, AMPHETAMINE ASPARTATE, DEXTROAMPHETAMINE SULFATE AND AMPHETAMINE SULFATE 2.5; 2.5; 2.5; 2.5 MG/1; MG/1; MG/1; MG/1
10 TABLET ORAL 2 TIMES DAILY
Qty: 60 TABLET | Refills: 0 | Status: SHIPPED | OUTPATIENT
Start: 2025-07-31 | End: 2025-08-30

## 2025-05-30 RX ORDER — METRONIDAZOLE 500 MG/1
TABLET ORAL
COMMUNITY
Start: 2025-02-20

## 2025-05-30 RX ORDER — IMIQUIMOD 12.5 MG/.25G
CREAM TOPICAL
COMMUNITY
Start: 2025-05-08

## 2025-05-30 NOTE — PROGRESS NOTES
[facility] Office Note  Chief Complaint:   Chief Complaint   Patient presents with    Medication Follow-Up    ADHD       HPI:   This is a 34 year old male coming in for medication follow-up.  The patient has been on Adderall he states that he has been doing well with the medication has not had any headaches chest pain.      Past Medical History[1]  Past Surgical History[2]  Social History:  Short Social Hx on File[3]  Family History:  Family History[4]  Allergies:  Allergies[5]  Current Meds:  Current Medications[6]   Counseling given: Not Answered       REVIEW OF SYSTEMS:   Consitutional: No fevers, chills, sweats  Eye: No recent visual problems  ENMT: No ear pain nasal congestion sore throat  Respiratory: No shortness of breath, cough  Cardiovascular: No chest pain palpitations syncope  Gastrointestinal: No nausea vomiting diarrhea  Genitourinary: No hematuria  Hema/Lymph no bruising tendency, swollen lymph glands       Medical, surgical, family, and social histories were reviewed      EXAM:     VITALS:   Vitals:    05/30/25 0848   BP: 114/70   Pulse: 97   Resp: 18   Temp: 97.8 °F (36.6 °C)      GENERAL: well developed, well nourished, in no apparent distress  SKIN: no rashes, no suspicious lesions: Cool and Dry  HEENT: atraumatic, normocephalic, ears and throat are clear    Ears: TM's clear and visible bilaterally, no excess cerumen or erythema.   EYES: Pupils equal round and reactive.  Extraocular motions intact no scleral icterus no injection or drainage  THROAT without erythema tonsillar hypertrophy or exudate.  Uvula midline airway patent  NECK: Given midline.  No JVD or lymphadenopathy supple nontender no meningeal signs   LUNGS: clear to auscultation sounds equal bilaterally no wheezes rales or rhonchi  CARDIO: Regular rate and rhythm without murmurs gallops or rubs  PSYCHIATRIC: Awake, alert and oriented x3, cooperative appropriate mood and affect.  Judgment intact        ASSESSMENT AND PLAN:   1. Attention deficit hyperactivity disorder (ADHD), predominantly inattentive type  - amphetamine-dextroamphetamine (ADDERALL) 10 MG Oral Tab; Take 1 tablet (10 mg total) by mouth 2 (two) times daily.  Dispense: 60 tablet; Refill: 0  - amphetamine-dextroamphetamine (ADDERALL) 10 MG Oral Tab; Take 1 tablet (10 mg total) by mouth 2 (two) times daily.  Dispense: 60 tablet; Refill: 0  - amphetamine-dextroamphetamine (ADDERALL) 10 MG Oral Tab; Take 1 tablet (10 mg total) by mouth 2 (two) times daily.  Dispense: 60 tablet; Refill: 0  I did discuss with the patient at this time we will continue with the Adderall 10 mg twice a day.  He was given a new prescription for this.  He was asked to follow-up with his primary care physician in the next 3 months.    Meds & Refills for this Visit:  Requested Prescriptions     Signed Prescriptions Disp Refills    amphetamine-dextroamphetamine (ADDERALL) 10 MG Oral Tab 60 tablet 0     Sig: Take 1 tablet (10 mg total) by mouth 2 (two) times daily.    amphetamine-dextroamphetamine (ADDERALL) 10 MG Oral Tab 60 tablet 0     Sig: Take 1 tablet (10 mg total) by mouth 2 (two) times daily.    amphetamine-dextroamphetamine (ADDERALL) 10 MG Oral Tab 60 tablet 0     Sig: Take 1 tablet (10 mg total) by mouth 2 (two) times daily.       Health Maintenance:  Health Maintenance Due   Topic Date Due    DTaP,Tdap,and Td Vaccines (1 - Tdap) Never done    COVID-19 Vaccine (3 - 2024-25 season) 09/01/2024    Annual Depression Screening  01/01/2025       Patient/Caregiver Education: Patient/Caregiver Education: There are no barriers to learning. Medical education done.   Outcome: Patient verbalizes understanding. Patient is notified to call with any questions, complications, allergies, or worsening or changing symptoms.  Patient is to call with any side effects or complications from the treatments as a result of today.     Problem List:  Problem List[7]      No follow-ups on file.      Sea Gonzalez MD    Please note that portions of this note may have been completed with a voice recognition program. Efforts were made to edit the dictations but occasionally words are mis-transcribed.       [1]   Past Medical History:   Allergic rhinitis    Attention deficit hyperactivity disorder (ADHD)    Back pain    Bloating    Chest pain    Esophageal reflux    Flatulence/gas pain/belching    Food intolerance    Heartburn    Hemorrhoids    High cholesterol    Irregular bowel habits    Shortness of breath    Sleep apnea    Sleep disturbance    Wears glasses   [2]   Past Surgical History:  Procedure Laterality Date    Egd  11/2021    gastritis   [3]   Social History  Socioeconomic History    Marital status: Single   Tobacco Use    Smoking status: Never     Passive exposure: Never    Smokeless tobacco: Never   Vaping Use    Vaping status: Never Used   Substance and Sexual Activity    Alcohol use: Yes     Alcohol/week: 5.0 standard drinks of alcohol     Types: 5 Cans of beer per week     Comment: socially    Drug use: Yes     Types: Cannabis     Comment: occasional    Sexual activity: Yes   Other Topics Concern    Caffeine Concern No    Sleep Concern Yes    Stress Concern No    Weight Concern No    Special Diet No    Exercise No    Seat Belt Yes   [4]   Family History  Problem Relation Age of Onset    Other (Diverticulitis) Mother     Cancer Maternal Grandmother         lung    Heart Attack Paternal Grandfather     Diabetes Paternal Grandfather    [5] No Known Allergies  [6]   Current Outpatient Medications   Medication Sig Dispense Refill    gabapentin 300 MG Oral Cap       Imiquimod 5 % External Cream       Podofilox 0.5 % External Solution       metroNIDAZOLE 500 MG Oral Tab TAKE 1 TABLET BY MOUTH IN THE MORNING AND IN THE EVENING FOR 7 DAYS      amphetamine-dextroamphetamine (ADDERALL) 10 MG Oral Tab Take 1 tablet (10 mg total) by mouth 2 (two) times daily. 60 tablet 0    [START ON 6/30/2025]  amphetamine-dextroamphetamine (ADDERALL) 10 MG Oral Tab Take 1 tablet (10 mg total) by mouth 2 (two) times daily. 60 tablet 0    [START ON 7/31/2025] amphetamine-dextroamphetamine (ADDERALL) 10 MG Oral Tab Take 1 tablet (10 mg total) by mouth 2 (two) times daily. 60 tablet 0    amphetamine-dextroamphetamine 10 MG Oral Tab Take 1 tablet (10 mg total) by mouth 2 (two) times daily. 60 tablet 0    ZEPBOUND 5 MG/0.5ML Subcutaneous Solution Auto-injector ADMINISTER 5 MG UNDER THE SKIN 1 TIME A WEEK FOR 4 DOSES 4 mL 0    OMEPRAZOLE 40 MG Oral Capsule Delayed Release TAKE 1 CAPSULE(40 MG) BY MOUTH DAILY 90 capsule 0    dicyclomine 10 MG Oral Cap Take 1 capsule (10 mg total) by mouth 4 (four) times daily as needed (cramping). 90 capsule 0    EPINEPHrine 0.3 MG/0.3ML Injection Solution Auto-injector       clotrimazole-betamethasone 1-0.05 % External Cream Apply 1 Application topically 2 (two) times daily. Apply to affected area for 3-4 weekss, then stop. Shower/clean area daily.  If disorder recurs in the future, please restart medication (Patient not taking: Reported on 5/30/2025) 45 g 5   [7]   Patient Active Problem List  Diagnosis    Herpes genitalis in men    Closed displaced fracture of shaft of fifth metacarpal bone of left hand, initial encounter    Gastroesophageal reflux disease

## 2025-07-21 DIAGNOSIS — F90.0 ATTENTION DEFICIT HYPERACTIVITY DISORDER (ADHD), PREDOMINANTLY INATTENTIVE TYPE: ICD-10-CM

## 2025-07-21 RX ORDER — DEXTROAMPHETAMINE SACCHARATE, AMPHETAMINE ASPARTATE, DEXTROAMPHETAMINE SULFATE AND AMPHETAMINE SULFATE 2.5; 2.5; 2.5; 2.5 MG/1; MG/1; MG/1; MG/1
10 TABLET ORAL 2 TIMES DAILY
Qty: 60 TABLET | Refills: 0 | Status: CANCELLED | OUTPATIENT
Start: 2025-07-21 | End: 2025-08-20

## 2025-07-22 NOTE — TELEPHONE ENCOUNTER
Requested Prescriptions     Pending Prescriptions Disp Refills    amphetamine-dextroamphetamine (ADDERALL) 10 MG Oral Tab 60 tablet 0     Sig: Take 1 tablet (10 mg total) by mouth 2 (two) times daily.     Refill remains at pharmacy - denied.

## 2025-07-28 DIAGNOSIS — F90.0 ATTENTION DEFICIT HYPERACTIVITY DISORDER (ADHD), PREDOMINANTLY INATTENTIVE TYPE: ICD-10-CM

## 2025-07-28 RX ORDER — DEXTROAMPHETAMINE SACCHARATE, AMPHETAMINE ASPARTATE, DEXTROAMPHETAMINE SULFATE AND AMPHETAMINE SULFATE 2.5; 2.5; 2.5; 2.5 MG/1; MG/1; MG/1; MG/1
10 TABLET ORAL 2 TIMES DAILY
Qty: 60 TABLET | Refills: 0 | Status: CANCELLED | OUTPATIENT
Start: 2025-07-28

## (undated) NOTE — LETTER
5/20/2024              Solomon Ceron        [address]   [id number]        [place] [id number]         Dear Solomon,    Our records indicate that the tests ordered for you by Jhoan De Dios MD  have not been done.  If you have, in fact, already completed the tests or you do not wish to have the tests done, please contact our office at THE NUMBER LISTED BELOW.  Otherwise, please proceed with the testing.        Sincerely,    Jhoan De Dios MD  [address] [id number] [phone]